# Patient Record
Sex: FEMALE | Race: WHITE | NOT HISPANIC OR LATINO | URBAN - METROPOLITAN AREA
[De-identification: names, ages, dates, MRNs, and addresses within clinical notes are randomized per-mention and may not be internally consistent; named-entity substitution may affect disease eponyms.]

---

## 2017-03-17 ENCOUNTER — EMERGENCY (EMERGENCY)
Facility: HOSPITAL | Age: 82
LOS: 0 days | Discharge: HOME | End: 2017-03-17
Attending: EMERGENCY MEDICINE | Admitting: EMERGENCY MEDICINE
Payer: COMMERCIAL

## 2017-03-17 VITALS
SYSTOLIC BLOOD PRESSURE: 105 MMHG | OXYGEN SATURATION: 98 % | RESPIRATION RATE: 18 BRPM | WEIGHT: 115.96 LBS | DIASTOLIC BLOOD PRESSURE: 51 MMHG | TEMPERATURE: 98 F | HEART RATE: 72 BPM

## 2017-03-17 VITALS
DIASTOLIC BLOOD PRESSURE: 64 MMHG | RESPIRATION RATE: 16 BRPM | SYSTOLIC BLOOD PRESSURE: 128 MMHG | TEMPERATURE: 98 F | OXYGEN SATURATION: 100 % | HEART RATE: 69 BPM

## 2017-03-17 DIAGNOSIS — Z91.81 HISTORY OF FALLING: ICD-10-CM
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
## 2017-03-17 DIAGNOSIS — S09.90XA UNSPECIFIED INJURY OF HEAD, INITIAL ENCOUNTER: ICD-10-CM
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
## 2017-03-17 DIAGNOSIS — F03.90 UNSPECIFIED DEMENTIA WITHOUT BEHAVIORAL DISTURBANCE: ICD-10-CM

## 2017-03-17 PROCEDURE — 99284 EMERGENCY DEPT VISIT MOD MDM: CPT

## 2017-03-17 PROCEDURE — 70450 CT HEAD/BRAIN W/O DYE: CPT | Mod: 26

## 2017-03-17 PROCEDURE — 72125 CT NECK SPINE W/O DYE: CPT | Mod: 26

## 2017-03-17 NOTE — ED PROVIDER NOTE - OBJECTIVE STATEMENT
89 y/o F with a PMHx of dementia presents to the ED from [facility] because she had a mechanical fall and protocol states that any fall needs to be brought to the ED for evaluation. Pt currently calm, confused, unsure as to why she is here and denies any acute c/o at this time.

## 2017-03-17 NOTE — ED PROVIDER NOTE - NS ED MD SCRIBE ATTENDING SCRIBE SECTIONS
DISPOSITION/PROGRESS NOTE/PHYSICAL EXAM/HISTORY OF PRESENT ILLNESS/REVIEW OF SYSTEMS/RESULTS/PAST MEDICAL/SURGICAL/SOCIAL HISTORY

## 2017-07-10 ENCOUNTER — EMERGENCY (EMERGENCY)
Facility: HOSPITAL | Age: 82
LOS: 0 days | Discharge: HOME | End: 2017-07-10
Attending: EMERGENCY MEDICINE | Admitting: EMERGENCY MEDICINE
Payer: COMMERCIAL

## 2017-07-10 VITALS
SYSTOLIC BLOOD PRESSURE: 184 MMHG | WEIGHT: 115.96 LBS | DIASTOLIC BLOOD PRESSURE: 97 MMHG | HEART RATE: 113 BPM | OXYGEN SATURATION: 98 % | TEMPERATURE: 97 F | RESPIRATION RATE: 16 BRPM

## 2017-07-10 VITALS
TEMPERATURE: 98 F | DIASTOLIC BLOOD PRESSURE: 88 MMHG | OXYGEN SATURATION: 99 % | SYSTOLIC BLOOD PRESSURE: 142 MMHG | RESPIRATION RATE: 18 BRPM | HEART RATE: 68 BPM
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
## 2017-07-10 DIAGNOSIS — S01.81XA LACERATION WITHOUT FOREIGN BODY OF OTHER PART OF HEAD, INITIAL ENCOUNTER: ICD-10-CM

## 2017-07-10 DIAGNOSIS — Z91.81 HISTORY OF FALLING: ICD-10-CM

## 2017-07-10 DIAGNOSIS — S09.90XA UNSPECIFIED INJURY OF HEAD, INITIAL ENCOUNTER: ICD-10-CM
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
## 2017-07-10 DIAGNOSIS — S02.2XXA FRACTURE OF NASAL BONES, INITIAL ENCOUNTER FOR CLOSED FRACTURE: ICD-10-CM

## 2017-07-10 DIAGNOSIS — F03.90 UNSPECIFIED DEMENTIA WITHOUT BEHAVIORAL DISTURBANCE: ICD-10-CM

## 2017-07-10 LAB
ABO RH CONFIRMATION: SIGNIFICANT CHANGE UP
ALBUMIN SERPL ELPH-MCNC: 3.5 G/DL — SIGNIFICANT CHANGE UP (ref 3.3–5)
ALP SERPL-CCNC: 74 U/L — SIGNIFICANT CHANGE UP (ref 40–120)
ALT FLD-CCNC: 15 U/L — SIGNIFICANT CHANGE UP (ref 12–78)
ANION GAP SERPL CALC-SCNC: 6 MMOL/L — SIGNIFICANT CHANGE UP (ref 5–17)
APPEARANCE UR: CLEAR — SIGNIFICANT CHANGE UP
APTT BLD: 31.2 SEC — SIGNIFICANT CHANGE UP (ref 27.5–37.4)
AST SERPL-CCNC: 18 U/L — SIGNIFICANT CHANGE UP (ref 15–37)
BASOPHILS # BLD AUTO: 0.1 K/UL — SIGNIFICANT CHANGE UP (ref 0–0.2)
BASOPHILS NFR BLD AUTO: 1.8 % — SIGNIFICANT CHANGE UP (ref 0–2)
BILIRUB SERPL-MCNC: 0.4 MG/DL — SIGNIFICANT CHANGE UP (ref 0.2–1.2)
BILIRUB UR-MCNC: NEGATIVE — SIGNIFICANT CHANGE UP
BLD GP AB SCN SERPL QL: SIGNIFICANT CHANGE UP
BUN SERPL-MCNC: 15 MG/DL — SIGNIFICANT CHANGE UP (ref 7–23)
CALCIUM SERPL-MCNC: 9.7 MG/DL — SIGNIFICANT CHANGE UP (ref 8.5–10.1)
CHLORIDE SERPL-SCNC: 104 MMOL/L — SIGNIFICANT CHANGE UP (ref 96–108)
CO2 SERPL-SCNC: 29 MMOL/L — SIGNIFICANT CHANGE UP (ref 22–31)
COLOR SPEC: YELLOW — SIGNIFICANT CHANGE UP
CREAT SERPL-MCNC: 0.7 MG/DL — SIGNIFICANT CHANGE UP (ref 0.5–1.3)
DIFF PNL FLD: NEGATIVE — SIGNIFICANT CHANGE UP
EOSINOPHIL # BLD AUTO: 0.2 K/UL — SIGNIFICANT CHANGE UP (ref 0–0.5)
EOSINOPHIL NFR BLD AUTO: 2.2 % — SIGNIFICANT CHANGE UP (ref 0–6)
GLUCOSE SERPL-MCNC: 102 MG/DL — HIGH (ref 70–99)
GLUCOSE UR QL: NEGATIVE MG/DL — SIGNIFICANT CHANGE UP
HCT VFR BLD CALC: 42.1 % — SIGNIFICANT CHANGE UP (ref 34.5–45)
HGB BLD-MCNC: 13.9 G/DL — SIGNIFICANT CHANGE UP (ref 11.5–15.5)
INR BLD: 1.03 RATIO — SIGNIFICANT CHANGE UP (ref 0.88–1.16)
KETONES UR-MCNC: NEGATIVE — SIGNIFICANT CHANGE UP
LEUKOCYTE ESTERASE UR-ACNC: NEGATIVE — SIGNIFICANT CHANGE UP
LYMPHOCYTES # BLD AUTO: 0.9 K/UL — LOW (ref 1–3.3)
LYMPHOCYTES # BLD AUTO: 11.5 % — LOW (ref 13–44)
MCHC RBC-ENTMCNC: 28.9 PG — SIGNIFICANT CHANGE UP (ref 27–34)
MCHC RBC-ENTMCNC: 32.9 GM/DL — SIGNIFICANT CHANGE UP (ref 32–36)
MCV RBC AUTO: 87.8 FL — SIGNIFICANT CHANGE UP (ref 80–100)
MONOCYTES # BLD AUTO: 1 K/UL — HIGH (ref 0–0.9)
MONOCYTES NFR BLD AUTO: 13 % — SIGNIFICANT CHANGE UP (ref 2–14)
NEUTROPHILS # BLD AUTO: 5.3 K/UL — SIGNIFICANT CHANGE UP (ref 1.8–7.4)
NEUTROPHILS NFR BLD AUTO: 71.4 % — SIGNIFICANT CHANGE UP (ref 43–77)
NITRITE UR-MCNC: NEGATIVE — SIGNIFICANT CHANGE UP
PH UR: 8 — SIGNIFICANT CHANGE UP (ref 5–8)
PLATELET # BLD AUTO: 223 K/UL — SIGNIFICANT CHANGE UP (ref 150–400)
POTASSIUM SERPL-MCNC: 4.2 MMOL/L — SIGNIFICANT CHANGE UP (ref 3.5–5.3)
POTASSIUM SERPL-SCNC: 4.2 MMOL/L — SIGNIFICANT CHANGE UP (ref 3.5–5.3)
PROT SERPL-MCNC: 8 GM/DL — SIGNIFICANT CHANGE UP (ref 6–8.3)
PROT UR-MCNC: NEGATIVE MG/DL — SIGNIFICANT CHANGE UP
PROTHROM AB SERPL-ACNC: 11.1 SEC — SIGNIFICANT CHANGE UP (ref 9.8–12.7)
RBC # BLD: 4.8 M/UL — SIGNIFICANT CHANGE UP (ref 3.8–5.2)
RBC # FLD: 12.7 % — SIGNIFICANT CHANGE UP (ref 10.3–14.5)
SODIUM SERPL-SCNC: 139 MMOL/L — SIGNIFICANT CHANGE UP (ref 135–145)
SP GR SPEC: 1.01 — SIGNIFICANT CHANGE UP (ref 1.01–1.02)
TYPE + AB SCN PNL BLD: SIGNIFICANT CHANGE UP
UROBILINOGEN FLD QL: NEGATIVE MG/DL — SIGNIFICANT CHANGE UP
WBC # BLD: 7.5 K/UL — SIGNIFICANT CHANGE UP (ref 3.8–10.5)
WBC # FLD AUTO: 7.5 K/UL — SIGNIFICANT CHANGE UP (ref 3.8–10.5)

## 2017-07-10 PROCEDURE — 93010 ELECTROCARDIOGRAM REPORT: CPT

## 2017-07-10 PROCEDURE — 71250 CT THORAX DX C-: CPT | Mod: 26

## 2017-07-10 PROCEDURE — 70486 CT MAXILLOFACIAL W/O DYE: CPT | Mod: 26

## 2017-07-10 PROCEDURE — 72125 CT NECK SPINE W/O DYE: CPT | Mod: 26

## 2017-07-10 PROCEDURE — 70450 CT HEAD/BRAIN W/O DYE: CPT | Mod: 26

## 2017-07-10 PROCEDURE — 99285 EMERGENCY DEPT VISIT HI MDM: CPT

## 2017-07-10 PROCEDURE — 74176 CT ABD & PELVIS W/O CONTRAST: CPT | Mod: 26

## 2017-07-10 PROCEDURE — 12011 RPR F/E/E/N/L/M 2.5 CM/<: CPT

## 2017-07-10 PROCEDURE — 76377 3D RENDER W/INTRP POSTPROCES: CPT | Mod: 26

## 2017-07-10 NOTE — ED PROVIDER NOTE - PROGRESS NOTE DETAILS
signed Suzanne Soares PA-C   Asked to suture pt. Pt is trauma alert, c-collar placed on initial evaluation maintaining C-spine immobilization at all times.

## 2017-07-10 NOTE — ED PROVIDER NOTE - OBJECTIVE STATEMENT
89 yo female with hx dementia sent from assisted living s/p fall. Patient needs walker, but was walking without and fell forward. hx obtained from EMS and NH. Patient doesn't answer questions so unable to provide history

## 2017-07-10 NOTE — ED ADULT NURSE NOTE - OBJECTIVE STATEMENT
Pt biba s/p witnessed fall at the NH.  Pt usually ambulates with a walker . Was excited about eating   breakfast and slipped and fell. No LOC, no anti-coags. laceration to the   right forehead

## 2017-07-10 NOTE — ED PROVIDER NOTE - CARE PLAN
Principal Discharge DX:	Head injury  Secondary Diagnosis:	Facial laceration  Secondary Diagnosis:	Nasal fracture

## 2017-08-30 ENCOUNTER — EMERGENCY (EMERGENCY)
Facility: HOSPITAL | Age: 82
LOS: 0 days | Discharge: HOME | End: 2017-08-30
Attending: EMERGENCY MEDICINE | Admitting: EMERGENCY MEDICINE
Payer: COMMERCIAL

## 2017-08-30 VITALS
SYSTOLIC BLOOD PRESSURE: 106 MMHG | HEART RATE: 73 BPM | DIASTOLIC BLOOD PRESSURE: 60 MMHG | RESPIRATION RATE: 16 BRPM | OXYGEN SATURATION: 95 % | TEMPERATURE: 98 F | WEIGHT: 145.06 LBS

## 2017-08-30 VITALS
DIASTOLIC BLOOD PRESSURE: 78 MMHG | OXYGEN SATURATION: 96 % | HEART RATE: 75 BPM | TEMPERATURE: 98 F | RESPIRATION RATE: 17 BRPM | SYSTOLIC BLOOD PRESSURE: 148 MMHG

## 2017-08-30 DIAGNOSIS — F03.90 UNSPECIFIED DEMENTIA WITHOUT BEHAVIORAL DISTURBANCE: ICD-10-CM

## 2017-08-30 DIAGNOSIS — R41.82 ALTERED MENTAL STATUS, UNSPECIFIED: ICD-10-CM

## 2017-08-30 DIAGNOSIS — I45.10 UNSPECIFIED RIGHT BUNDLE-BRANCH BLOCK: ICD-10-CM

## 2017-08-30 DIAGNOSIS — E86.0 DEHYDRATION: ICD-10-CM

## 2017-08-30 LAB
ALBUMIN SERPL ELPH-MCNC: 3.3 G/DL — SIGNIFICANT CHANGE UP (ref 3.3–5)
ALP SERPL-CCNC: 80 U/L — SIGNIFICANT CHANGE UP (ref 40–120)
ALT FLD-CCNC: 21 U/L — SIGNIFICANT CHANGE UP (ref 12–78)
ANION GAP SERPL CALC-SCNC: 6 MMOL/L — SIGNIFICANT CHANGE UP (ref 5–17)
APTT BLD: 31.1 SEC — SIGNIFICANT CHANGE UP (ref 27.5–37.4)
AST SERPL-CCNC: 18 U/L — SIGNIFICANT CHANGE UP (ref 15–37)
BASOPHILS # BLD AUTO: 0.2 K/UL — SIGNIFICANT CHANGE UP (ref 0–0.2)
BASOPHILS NFR BLD AUTO: 1 % — SIGNIFICANT CHANGE UP (ref 0–2)
BILIRUB SERPL-MCNC: 0.2 MG/DL — SIGNIFICANT CHANGE UP (ref 0.2–1.2)
BUN SERPL-MCNC: 26 MG/DL — HIGH (ref 7–23)
CALCIUM SERPL-MCNC: 9.5 MG/DL — SIGNIFICANT CHANGE UP (ref 8.5–10.1)
CHLORIDE SERPL-SCNC: 105 MMOL/L — SIGNIFICANT CHANGE UP (ref 96–108)
CO2 SERPL-SCNC: 30 MMOL/L — SIGNIFICANT CHANGE UP (ref 22–31)
CREAT SERPL-MCNC: 1 MG/DL — SIGNIFICANT CHANGE UP (ref 0.5–1.3)
EOSINOPHIL # BLD AUTO: 0.3 K/UL — SIGNIFICANT CHANGE UP (ref 0–0.5)
EOSINOPHIL NFR BLD AUTO: 1 % — SIGNIFICANT CHANGE UP (ref 0–6)
GLUCOSE SERPL-MCNC: 110 MG/DL — HIGH (ref 70–99)
HCT VFR BLD CALC: 41.6 % — SIGNIFICANT CHANGE UP (ref 34.5–45)
HGB BLD-MCNC: 14.2 G/DL — SIGNIFICANT CHANGE UP (ref 11.5–15.5)
INR BLD: 1.05 RATIO — SIGNIFICANT CHANGE UP (ref 0.88–1.16)
LYMPHOCYTES # BLD AUTO: 1.2 K/UL — SIGNIFICANT CHANGE UP (ref 1–3.3)
LYMPHOCYTES # BLD AUTO: 11 % — LOW (ref 13–44)
MANUAL DIF COMMENT BLD-IMP: SIGNIFICANT CHANGE UP
MCHC RBC-ENTMCNC: 30.2 PG — SIGNIFICANT CHANGE UP (ref 27–34)
MCHC RBC-ENTMCNC: 34 GM/DL — SIGNIFICANT CHANGE UP (ref 32–36)
MCV RBC AUTO: 88.8 FL — SIGNIFICANT CHANGE UP (ref 80–100)
MONOCYTES # BLD AUTO: 1.3 K/UL — HIGH (ref 0–0.9)
MONOCYTES NFR BLD AUTO: 15 % — HIGH (ref 2–14)
NEUTROPHILS # BLD AUTO: 5.2 K/UL — SIGNIFICANT CHANGE UP (ref 1.8–7.4)
NEUTROPHILS NFR BLD AUTO: 70 % — SIGNIFICANT CHANGE UP (ref 43–77)
NEUTS BAND # BLD: 2 % — SIGNIFICANT CHANGE UP (ref 0–8)
PLAT MORPH BLD: NORMAL — SIGNIFICANT CHANGE UP
PLATELET # BLD AUTO: 224 K/UL — SIGNIFICANT CHANGE UP (ref 150–400)
POTASSIUM SERPL-MCNC: 4.4 MMOL/L — SIGNIFICANT CHANGE UP (ref 3.5–5.3)
POTASSIUM SERPL-SCNC: 4.4 MMOL/L — SIGNIFICANT CHANGE UP (ref 3.5–5.3)
PROT SERPL-MCNC: 8.1 GM/DL — SIGNIFICANT CHANGE UP (ref 6–8.3)
PROTHROM AB SERPL-ACNC: 11.3 SEC — SIGNIFICANT CHANGE UP (ref 9.8–12.7)
RBC # BLD: 4.69 M/UL — SIGNIFICANT CHANGE UP (ref 3.8–5.2)
RBC # FLD: 13.9 % — SIGNIFICANT CHANGE UP (ref 10.3–14.5)
RBC BLD AUTO: SIGNIFICANT CHANGE UP
SODIUM SERPL-SCNC: 141 MMOL/L — SIGNIFICANT CHANGE UP (ref 135–145)
TROPONIN I SERPL-MCNC: <0.015 NG/ML — SIGNIFICANT CHANGE UP (ref 0.01–0.04)
WBC # BLD: 8.2 K/UL — SIGNIFICANT CHANGE UP (ref 3.8–10.5)
WBC # FLD AUTO: 8.2 K/UL — SIGNIFICANT CHANGE UP (ref 3.8–10.5)

## 2017-08-30 PROCEDURE — 71010: CPT | Mod: 26

## 2017-08-30 PROCEDURE — 93010 ELECTROCARDIOGRAM REPORT: CPT

## 2017-08-30 PROCEDURE — 99285 EMERGENCY DEPT VISIT HI MDM: CPT

## 2017-08-30 RX ORDER — SODIUM CHLORIDE 9 MG/ML
1000 INJECTION INTRAMUSCULAR; INTRAVENOUS; SUBCUTANEOUS ONCE
Qty: 0 | Refills: 0 | Status: COMPLETED | OUTPATIENT
Start: 2017-08-30 | End: 2017-08-30

## 2017-08-30 RX ORDER — SODIUM CHLORIDE 9 MG/ML
3 INJECTION INTRAMUSCULAR; INTRAVENOUS; SUBCUTANEOUS ONCE
Qty: 0 | Refills: 0 | Status: COMPLETED | OUTPATIENT
Start: 2017-08-30 | End: 2017-08-30

## 2017-08-30 RX ADMIN — SODIUM CHLORIDE 1000 MILLILITER(S): 9 INJECTION INTRAMUSCULAR; INTRAVENOUS; SUBCUTANEOUS at 18:00

## 2017-08-30 RX ADMIN — SODIUM CHLORIDE 3 MILLILITER(S): 9 INJECTION INTRAMUSCULAR; INTRAVENOUS; SUBCUTANEOUS at 16:52

## 2017-08-30 NOTE — ED ADULT TRIAGE NOTE - CHIEF COMPLAINT QUOTE
witnessed syncope, sitting with daughter, no fall, unconscious for 2 minutes, now at baseline level of dementia per ambulance/daughter. DNR/DNI

## 2017-08-30 NOTE — ED PROVIDER NOTE - OBJECTIVE STATEMENT
89 yo female h/o dementia presents with episode of AMS. As per daughter they were at [facility] sitting in the courtyard when pt began feeling dizzy and became out of it for 8-10 minutes. States pt did not lose consciousness but was moaning and almost fell over. Denies fall. Has not ambulated since. Pt is back to baseline per daughter.

## 2017-08-30 NOTE — ED PROVIDER NOTE - PROGRESS NOTE DETAILS
Pt remains at baseline per daughter. EKG nonischemic, no monitor events, trop negative. Daughter requesting DC back to [facility]

## 2017-09-26 ENCOUNTER — EMERGENCY (EMERGENCY)
Facility: HOSPITAL | Age: 82
LOS: 0 days | Discharge: HOME | End: 2017-09-26
Attending: EMERGENCY MEDICINE | Admitting: EMERGENCY MEDICINE
Payer: COMMERCIAL

## 2017-09-26 VITALS
OXYGEN SATURATION: 100 % | HEART RATE: 67 BPM | DIASTOLIC BLOOD PRESSURE: 77 MMHG | WEIGHT: 139.99 LBS | SYSTOLIC BLOOD PRESSURE: 139 MMHG | TEMPERATURE: 97 F | HEIGHT: 66 IN | RESPIRATION RATE: 16 BRPM

## 2017-09-26 VITALS
TEMPERATURE: 98 F | RESPIRATION RATE: 17 BRPM | OXYGEN SATURATION: 100 % | DIASTOLIC BLOOD PRESSURE: 81 MMHG | SYSTOLIC BLOOD PRESSURE: 189 MMHG | HEART RATE: 74 BPM

## 2017-09-26 DIAGNOSIS — R29.6 REPEATED FALLS: ICD-10-CM

## 2017-09-26 DIAGNOSIS — I67.89 OTHER CEREBROVASCULAR DISEASE: ICD-10-CM

## 2017-09-26 DIAGNOSIS — Z04.3 ENCOUNTER FOR EXAMINATION AND OBSERVATION FOLLOWING OTHER ACCIDENT: ICD-10-CM

## 2017-09-26 DIAGNOSIS — M50.30 OTHER CERVICAL DISC DEGENERATION, UNSPECIFIED CERVICAL REGION: ICD-10-CM

## 2017-09-26 DIAGNOSIS — M47.9 SPONDYLOSIS, UNSPECIFIED: ICD-10-CM

## 2017-09-26 PROCEDURE — 72125 CT NECK SPINE W/O DYE: CPT | Mod: 26

## 2017-09-26 PROCEDURE — 72190 X-RAY EXAM OF PELVIS: CPT | Mod: 26

## 2017-09-26 PROCEDURE — 70450 CT HEAD/BRAIN W/O DYE: CPT | Mod: 26

## 2017-09-26 PROCEDURE — 99284 EMERGENCY DEPT VISIT MOD MDM: CPT

## 2017-09-26 PROCEDURE — 71010: CPT | Mod: 26

## 2017-09-26 NOTE — ED PROVIDER NOTE - MEDICAL DECISION MAKING DETAILS
89 yo female h/o dementia presents s/p fall. Plan CT head. 89 yo female h/o dementia presents s/p fall. Plan hip and c-spine CT, chest and hip XR.

## 2017-09-26 NOTE — ED ADULT NURSE NOTE - OBJECTIVE STATEMENT
Pt BIBA for unwitnessed fall at [facility] - pt with h/o dementia and confused at baseline - pt denies any injuries or pain - no s/s of injury noted. Trauma alert cancelled.

## 2017-09-26 NOTE — ED PROVIDER NOTE - OBJECTIVE STATEMENT
89 yo female h/o dementia presents s/p unwitnessed fall with head injury. Not on blood thinners. Patient does not know what happened today or why she is here. Denies pain at this time. Has no complaints.

## 2017-12-19 ENCOUNTER — INPATIENT (INPATIENT)
Facility: HOSPITAL | Age: 82
LOS: 7 days | Discharge: NURSING, REHAB OR LONG-TERM CARE | End: 2017-12-27
Attending: INTERNAL MEDICINE | Admitting: INTERNAL MEDICINE
Payer: COMMERCIAL

## 2017-12-19 VITALS
OXYGEN SATURATION: 98 % | HEART RATE: 76 BPM | WEIGHT: 110.01 LBS | TEMPERATURE: 98 F | RESPIRATION RATE: 17 BRPM | DIASTOLIC BLOOD PRESSURE: 84 MMHG | SYSTOLIC BLOOD PRESSURE: 155 MMHG

## 2017-12-19 LAB
ALBUMIN SERPL ELPH-MCNC: 3.4 G/DL — SIGNIFICANT CHANGE UP (ref 3.3–5)
ALP SERPL-CCNC: 77 U/L — SIGNIFICANT CHANGE UP (ref 40–120)
ALT FLD-CCNC: 21 U/L — SIGNIFICANT CHANGE UP (ref 12–78)
ANION GAP SERPL CALC-SCNC: 7 MMOL/L — SIGNIFICANT CHANGE UP (ref 5–17)
APPEARANCE UR: CLEAR — SIGNIFICANT CHANGE UP
APTT BLD: 31.8 SEC — SIGNIFICANT CHANGE UP (ref 27.5–37.4)
AST SERPL-CCNC: 20 U/L — SIGNIFICANT CHANGE UP (ref 15–37)
BACTERIA # UR AUTO: (no result)
BASOPHILS # BLD AUTO: 0.1 K/UL — SIGNIFICANT CHANGE UP (ref 0–0.2)
BASOPHILS NFR BLD AUTO: 1.6 % — SIGNIFICANT CHANGE UP (ref 0–2)
BILIRUB SERPL-MCNC: 0.5 MG/DL — SIGNIFICANT CHANGE UP (ref 0.2–1.2)
BILIRUB UR-MCNC: NEGATIVE — SIGNIFICANT CHANGE UP
BUN SERPL-MCNC: 21 MG/DL — SIGNIFICANT CHANGE UP (ref 7–23)
CALCIUM SERPL-MCNC: 9.5 MG/DL — SIGNIFICANT CHANGE UP (ref 8.5–10.1)
CHLORIDE SERPL-SCNC: 107 MMOL/L — SIGNIFICANT CHANGE UP (ref 96–108)
CO2 SERPL-SCNC: 29 MMOL/L — SIGNIFICANT CHANGE UP (ref 22–31)
COLOR SPEC: YELLOW — SIGNIFICANT CHANGE UP
CREAT SERPL-MCNC: 0.83 MG/DL — SIGNIFICANT CHANGE UP (ref 0.5–1.3)
DIFF PNL FLD: (no result)
EOSINOPHIL # BLD AUTO: 0.2 K/UL — SIGNIFICANT CHANGE UP (ref 0–0.5)
EOSINOPHIL NFR BLD AUTO: 3 % — SIGNIFICANT CHANGE UP (ref 0–6)
EPI CELLS # UR: SIGNIFICANT CHANGE UP
GLUCOSE BLDC GLUCOMTR-MCNC: 98 MG/DL — SIGNIFICANT CHANGE UP (ref 70–99)
GLUCOSE SERPL-MCNC: 99 MG/DL — SIGNIFICANT CHANGE UP (ref 70–99)
GLUCOSE UR QL: NEGATIVE MG/DL — SIGNIFICANT CHANGE UP
HCT VFR BLD CALC: 47.2 % — HIGH (ref 34.5–45)
HGB BLD-MCNC: 15.1 G/DL — SIGNIFICANT CHANGE UP (ref 11.5–15.5)
INR BLD: 1.07 RATIO — SIGNIFICANT CHANGE UP (ref 0.88–1.16)
KETONES UR-MCNC: (no result)
LACTATE SERPL-SCNC: 1 MMOL/L — SIGNIFICANT CHANGE UP (ref 0.7–2)
LEUKOCYTE ESTERASE UR-ACNC: (no result)
LYMPHOCYTES # BLD AUTO: 1.3 K/UL — SIGNIFICANT CHANGE UP (ref 1–3.3)
LYMPHOCYTES # BLD AUTO: 16.4 % — SIGNIFICANT CHANGE UP (ref 13–44)
MCHC RBC-ENTMCNC: 28.8 PG — SIGNIFICANT CHANGE UP (ref 27–34)
MCHC RBC-ENTMCNC: 31.9 GM/DL — LOW (ref 32–36)
MCV RBC AUTO: 90.2 FL — SIGNIFICANT CHANGE UP (ref 80–100)
MONOCYTES # BLD AUTO: 0.9 K/UL — SIGNIFICANT CHANGE UP (ref 0–0.9)
MONOCYTES NFR BLD AUTO: 12.3 % — SIGNIFICANT CHANGE UP (ref 2–14)
NEUTROPHILS # BLD AUTO: 5.1 K/UL — SIGNIFICANT CHANGE UP (ref 1.8–7.4)
NEUTROPHILS NFR BLD AUTO: 66.7 % — SIGNIFICANT CHANGE UP (ref 43–77)
NITRITE UR-MCNC: NEGATIVE — SIGNIFICANT CHANGE UP
PH UR: 6.5 — SIGNIFICANT CHANGE UP (ref 5–8)
PLATELET # BLD AUTO: 256 K/UL — SIGNIFICANT CHANGE UP (ref 150–400)
POTASSIUM SERPL-MCNC: 4.2 MMOL/L — SIGNIFICANT CHANGE UP (ref 3.5–5.3)
POTASSIUM SERPL-SCNC: 4.2 MMOL/L — SIGNIFICANT CHANGE UP (ref 3.5–5.3)
PROT SERPL-MCNC: 8.1 GM/DL — SIGNIFICANT CHANGE UP (ref 6–8.3)
PROT UR-MCNC: NEGATIVE MG/DL — SIGNIFICANT CHANGE UP
PROTHROM AB SERPL-ACNC: 11.6 SEC — SIGNIFICANT CHANGE UP (ref 9.8–12.7)
RAPID RVP RESULT: SIGNIFICANT CHANGE UP
RBC # BLD: 5.24 M/UL — HIGH (ref 3.8–5.2)
RBC # FLD: 13.2 % — SIGNIFICANT CHANGE UP (ref 10.3–14.5)
RBC CASTS # UR COMP ASSIST: (no result) /HPF (ref 0–4)
SODIUM SERPL-SCNC: 143 MMOL/L — SIGNIFICANT CHANGE UP (ref 135–145)
SP GR SPEC: 1.01 — SIGNIFICANT CHANGE UP (ref 1.01–1.02)
TROPONIN I SERPL-MCNC: <0.015 NG/ML — SIGNIFICANT CHANGE UP (ref 0.01–0.04)
UROBILINOGEN FLD QL: NEGATIVE MG/DL — SIGNIFICANT CHANGE UP
WBC # BLD: 7.7 K/UL — SIGNIFICANT CHANGE UP (ref 3.8–10.5)
WBC # FLD AUTO: 7.7 K/UL — SIGNIFICANT CHANGE UP (ref 3.8–10.5)
WBC UR QL: (no result)

## 2017-12-19 PROCEDURE — 71010: CPT | Mod: 26

## 2017-12-19 PROCEDURE — 99285 EMERGENCY DEPT VISIT HI MDM: CPT

## 2017-12-19 PROCEDURE — 93010 ELECTROCARDIOGRAM REPORT: CPT

## 2017-12-19 PROCEDURE — 70450 CT HEAD/BRAIN W/O DYE: CPT | Mod: 26

## 2017-12-19 RX ORDER — CEFTRIAXONE 500 MG/1
1 INJECTION, POWDER, FOR SOLUTION INTRAMUSCULAR; INTRAVENOUS ONCE
Qty: 0 | Refills: 0 | Status: COMPLETED | OUTPATIENT
Start: 2017-12-19 | End: 2017-12-19

## 2017-12-19 RX ORDER — SODIUM CHLORIDE 9 MG/ML
500 INJECTION INTRAMUSCULAR; INTRAVENOUS; SUBCUTANEOUS ONCE
Qty: 0 | Refills: 0 | Status: COMPLETED | OUTPATIENT
Start: 2017-12-19 | End: 2017-12-19

## 2017-12-19 RX ORDER — AMLODIPINE BESYLATE 2.5 MG/1
1 TABLET ORAL
Qty: 0 | Refills: 0 | COMMUNITY

## 2017-12-19 RX ORDER — MIRABEGRON 50 MG/1
1 TABLET, EXTENDED RELEASE ORAL
Qty: 0 | Refills: 0 | COMMUNITY

## 2017-12-19 RX ADMIN — CEFTRIAXONE 100 GRAM(S): 500 INJECTION, POWDER, FOR SOLUTION INTRAMUSCULAR; INTRAVENOUS at 21:53

## 2017-12-19 RX ADMIN — SODIUM CHLORIDE 500 MILLILITER(S): 9 INJECTION INTRAMUSCULAR; INTRAVENOUS; SUBCUTANEOUS at 15:53

## 2017-12-19 NOTE — ED PROVIDER NOTE - CARE PLAN
Principal Discharge DX:	CVA (cerebral vascular accident)  Secondary Diagnosis:	UTI (urinary tract infection)

## 2017-12-19 NOTE — ED PROVIDER NOTE - OBJECTIVE STATEMENT
89 y/o F with PMHx of dementia, HTN presents to the ED from [facility] living regarding change in mental status. Per EMS, pt is at baseline, but pt has not been eating or drinking. No other history available. Pt is a poor historian due to dementia.

## 2017-12-19 NOTE — ED PROVIDER NOTE - CARDIAC, MLM
Normal rate, regular rhythm.  Heart sounds S1, S2.  No murmurs, rubs or gallops. +trace peripheral edema

## 2017-12-19 NOTE — ED ADULT NURSE NOTE - OBJECTIVE STATEMENT
patient biba for altered mental status as per ambulance crew.  Patient is awake and alert, oriented to self only.  vss rectal temp 97.6.  patient cooperative with exam

## 2017-12-20 DIAGNOSIS — Z29.9 ENCOUNTER FOR PROPHYLACTIC MEASURES, UNSPECIFIED: ICD-10-CM

## 2017-12-20 DIAGNOSIS — F03.90 UNSPECIFIED DEMENTIA WITHOUT BEHAVIORAL DISTURBANCE: ICD-10-CM

## 2017-12-20 DIAGNOSIS — N39.0 URINARY TRACT INFECTION, SITE NOT SPECIFIED: ICD-10-CM

## 2017-12-20 DIAGNOSIS — I10 ESSENTIAL (PRIMARY) HYPERTENSION: ICD-10-CM

## 2017-12-20 DIAGNOSIS — I63.9 CEREBRAL INFARCTION, UNSPECIFIED: ICD-10-CM

## 2017-12-20 LAB
ANION GAP SERPL CALC-SCNC: 7 MMOL/L — SIGNIFICANT CHANGE UP (ref 5–17)
BUN SERPL-MCNC: 17 MG/DL — SIGNIFICANT CHANGE UP (ref 7–23)
CALCIUM SERPL-MCNC: 8.9 MG/DL — SIGNIFICANT CHANGE UP (ref 8.5–10.1)
CHLORIDE SERPL-SCNC: 107 MMOL/L — SIGNIFICANT CHANGE UP (ref 96–108)
CHOLEST SERPL-MCNC: 247 MG/DL — HIGH (ref 10–199)
CO2 SERPL-SCNC: 27 MMOL/L — SIGNIFICANT CHANGE UP (ref 22–31)
CREAT SERPL-MCNC: 0.71 MG/DL — SIGNIFICANT CHANGE UP (ref 0.5–1.3)
GLUCOSE SERPL-MCNC: 93 MG/DL — SIGNIFICANT CHANGE UP (ref 70–99)
HBA1C BLD-MCNC: 5.8 % — HIGH (ref 4–5.6)
HDLC SERPL-MCNC: 42 MG/DL — SIGNIFICANT CHANGE UP (ref 40–125)
LIPID PNL WITH DIRECT LDL SERPL: 181 MG/DL — HIGH
POTASSIUM SERPL-MCNC: 3.9 MMOL/L — SIGNIFICANT CHANGE UP (ref 3.5–5.3)
POTASSIUM SERPL-SCNC: 3.9 MMOL/L — SIGNIFICANT CHANGE UP (ref 3.5–5.3)
SODIUM SERPL-SCNC: 141 MMOL/L — SIGNIFICANT CHANGE UP (ref 135–145)
TOTAL CHOLESTEROL/HDL RATIO MEASUREMENT: 5.9 RATIO — SIGNIFICANT CHANGE UP (ref 3.3–7.1)
TRIGL SERPL-MCNC: 121 MG/DL — SIGNIFICANT CHANGE UP (ref 10–149)

## 2017-12-20 PROCEDURE — 99285 EMERGENCY DEPT VISIT HI MDM: CPT

## 2017-12-20 RX ORDER — QUETIAPINE FUMARATE 200 MG/1
25 TABLET, FILM COATED ORAL AT BEDTIME
Qty: 0 | Refills: 0 | Status: DISCONTINUED | OUTPATIENT
Start: 2017-12-20 | End: 2017-12-21

## 2017-12-20 RX ORDER — HALOPERIDOL DECANOATE 100 MG/ML
2.5 INJECTION INTRAMUSCULAR ONCE
Qty: 0 | Refills: 0 | Status: COMPLETED | OUTPATIENT
Start: 2017-12-20 | End: 2017-12-20

## 2017-12-20 RX ORDER — SODIUM CHLORIDE 9 MG/ML
1000 INJECTION INTRAMUSCULAR; INTRAVENOUS; SUBCUTANEOUS
Qty: 0 | Refills: 0 | Status: COMPLETED | OUTPATIENT
Start: 2017-12-20 | End: 2017-12-21

## 2017-12-20 RX ORDER — ASPIRIN/CALCIUM CARB/MAGNESIUM 324 MG
325 TABLET ORAL DAILY
Qty: 0 | Refills: 0 | Status: DISCONTINUED | OUTPATIENT
Start: 2017-12-20 | End: 2017-12-27

## 2017-12-20 RX ORDER — ASPIRIN/CALCIUM CARB/MAGNESIUM 324 MG
325 TABLET ORAL ONCE
Qty: 0 | Refills: 0 | Status: COMPLETED | OUTPATIENT
Start: 2017-12-20 | End: 2017-12-20

## 2017-12-20 RX ORDER — ATORVASTATIN CALCIUM 80 MG/1
40 TABLET, FILM COATED ORAL AT BEDTIME
Qty: 0 | Refills: 0 | Status: DISCONTINUED | OUTPATIENT
Start: 2017-12-20 | End: 2017-12-27

## 2017-12-20 RX ORDER — CEFTRIAXONE 500 MG/1
1 INJECTION, POWDER, FOR SOLUTION INTRAMUSCULAR; INTRAVENOUS EVERY 24 HOURS
Qty: 0 | Refills: 0 | Status: DISCONTINUED | OUTPATIENT
Start: 2017-12-20 | End: 2017-12-20

## 2017-12-20 RX ORDER — HEPARIN SODIUM 5000 [USP'U]/ML
5000 INJECTION INTRAVENOUS; SUBCUTANEOUS EVERY 12 HOURS
Qty: 0 | Refills: 0 | Status: DISCONTINUED | OUTPATIENT
Start: 2017-12-20 | End: 2017-12-27

## 2017-12-20 RX ORDER — METOPROLOL TARTRATE 50 MG
25 TABLET ORAL DAILY
Qty: 0 | Refills: 0 | Status: DISCONTINUED | OUTPATIENT
Start: 2017-12-20 | End: 2017-12-20

## 2017-12-20 RX ORDER — AMLODIPINE BESYLATE 2.5 MG/1
2.5 TABLET ORAL DAILY
Qty: 0 | Refills: 0 | Status: DISCONTINUED | OUTPATIENT
Start: 2017-12-20 | End: 2017-12-20

## 2017-12-20 RX ORDER — CEFTRIAXONE 500 MG/1
1000 INJECTION, POWDER, FOR SOLUTION INTRAMUSCULAR; INTRAVENOUS EVERY 24 HOURS
Qty: 0 | Refills: 0 | Status: DISCONTINUED | OUTPATIENT
Start: 2017-12-20 | End: 2017-12-22

## 2017-12-20 RX ADMIN — HALOPERIDOL DECANOATE 2.5 MILLIGRAM(S): 100 INJECTION INTRAMUSCULAR at 17:28

## 2017-12-20 RX ADMIN — HEPARIN SODIUM 5000 UNIT(S): 5000 INJECTION INTRAVENOUS; SUBCUTANEOUS at 17:29

## 2017-12-20 RX ADMIN — Medication 325 MILLIGRAM(S): at 00:22

## 2017-12-20 RX ADMIN — SODIUM CHLORIDE 75 MILLILITER(S): 9 INJECTION INTRAMUSCULAR; INTRAVENOUS; SUBCUTANEOUS at 05:38

## 2017-12-20 RX ADMIN — QUETIAPINE FUMARATE 25 MILLIGRAM(S): 200 TABLET, FILM COATED ORAL at 22:12

## 2017-12-20 RX ADMIN — ATORVASTATIN CALCIUM 40 MILLIGRAM(S): 80 TABLET, FILM COATED ORAL at 22:12

## 2017-12-20 RX ADMIN — HEPARIN SODIUM 5000 UNIT(S): 5000 INJECTION INTRAVENOUS; SUBCUTANEOUS at 05:30

## 2017-12-20 RX ADMIN — CEFTRIAXONE 1000 MILLIGRAM(S): 500 INJECTION, POWDER, FOR SOLUTION INTRAMUSCULAR; INTRAVENOUS at 22:12

## 2017-12-20 NOTE — H&P ADULT - NSHPPHYSICALEXAM_GEN_ALL_CORE
Vital Signs Last 24 Hrs  T(C): 36.1 (19 Dec 2017 21:47), Max: 36.8 (19 Dec 2017 14:35)  T(F): 97 (19 Dec 2017 21:47), Max: 98.3 (19 Dec 2017 14:35)  HR: 73 (19 Dec 2017 21:47) (70 - 76)  BP: 123/84 (19 Dec 2017 21:47) (123/84 - 155/84)  BP(mean): --  RR: 16 (19 Dec 2017 21:47) (16 - 17)  SpO2: 97% (19 Dec 2017 21:47) (97% - 98%)

## 2017-12-20 NOTE — PROGRESS NOTE ADULT - ASSESSMENT
89 y/o F with PMHx of dementia and HTN presents to the ED from [facility] assisted living (dementia unit) found to have subacute CVA in the posterior left temporal occipital lobes as seen on CT head and UTI.      *Acute metabolic encephalopathy on top of baseline severe dementia- suspect 2/2 UTI and Subacute CVA. Unclear what her baseline mental status is, awaiting callback from daughter.  -ASA  -Statin  -Hold BP meds temporarily  -Speech and swallow  - mechanical soft  -Echo  -Tele monitoring  -MRI / MRA  -Neuro consult  -Neuro checks Q4  -PT  - Haldol x 1.  Start seroquel at night.    *UTI  - C/w ceftriaxone  - f/u cultures    *HTN  - BP meds held temporarily 2/2 CVA    *DVT ppx  - Heparin SubQ

## 2017-12-20 NOTE — H&P ADULT - HISTORY OF PRESENT ILLNESS
89 y/o F with PMHx of dementia and HTN presents to the ED from [facility] assisted living (dementia unit) regarding change in mental status. Per ED chart review, as per EMS pt is at baseline, but pt has not been eating or drinking. Pt is a poor historian due to dementia.  Patient is unable to answer questions or follow directions, which is patient's baseline as per my conversation with the nurse at [facility].    While in the ED patient was noted to have CT head positive for subacute stroke and UTI.  Patient was given ASA 325mg x1 and ceftriaxone. 89 y/o F with PMHx of dementia and HTN presents to the ED from [facility] (dementia unit) with change in mental status. Per ED chart review, as per EMS, pt is at baseline, but pt has not been eating or drinking. Pt is a poor historian due to dementia.  Patient mumbles answers to questions and nods her head in response to questions however does not follow directions, which is patient's baseline as per my conversation with the nurse aid Gemini at [facility].    While in the ED patient was noted to have CT head positive for subacute stroke and UTI.  Patient was given ASA 325mg x1 and ceftriaxone. 89 y/o F with PMHx of dementia and HTN presents to the ED from [facility] (dementia unit) with change in mental status. Per ED chart review, as per EMS, pt is at baseline, but pt has not been eating or drinking. Pt is a poor historian due to dementia.  Patient mumbles answers to questions and nods her head in response to questions however does not follow directions, which is patient's baseline as per lengthy conversation with the nurse aid Gemini at [facility].    While in the ED patient was noted to have CT head positive for subacute stroke and UTI.  Patient was given ASA 325mg x1 and ceftriaxone.

## 2017-12-20 NOTE — ED ADULT NURSE REASSESSMENT NOTE - NS ED NURSE REASSESS COMMENT FT1
Pt is confused and is unable to follow commands, unable to complete neurological checks.
Pt rec'd from [facility]. Pt is confused and combative. Constant observation placed on pt for safety and tube pulling prevention. Cardiac monitoring in place, but refusing VS at this time. NSR as per cardiac tech. Unable to perform neuro checks due to confusion. Safety and comfort maintained.
patient straight for 200 cc of cloudy yellow urine. patient tolerated procedure without event.  family updated on plan of care
Received patient from [facility]. Pt disoriented, hx of dementia. Pt calm at present, unable to perform neuro check due to patient unable to follow instructions. No weakness observed, pt cleaned and turned, pt able to reach for each side rail without difficulty, observed patient moving both legs independently. No facial asymmetry observed and no slurred speech observed. Pt refusing assessment, will attempt at a later time. Comfort and safety measures maintained. Will continue to monitor.

## 2017-12-20 NOTE — H&P ADULT - PROBLEM SELECTOR PLAN 3
-continue amlodipine and metoprolol daily -hold amlodipine and metoprolol for permissive hypertension post CVA

## 2017-12-20 NOTE — H&P ADULT - PROBLEM SELECTOR PLAN 1
-Admit to telemetry  -subacute infarction in the posterior left temporal occipital lobes, no acute hemorrhage  -s/p ASA 325mg in the ED  -continue ASA 81mg  -atorvastatin 40mg  -Lipid panel  -neuro consult - Dr. RONALD Rolon  -MRI head  -neuro checks  -fall precautions -Admit to telemetry  -CT head - subacute infarction in the posterior left temporal occipital lobes, no acute hemorrhage  -s/p ASA 325mg in the ED  -continue ASA 81mg  -atorvastatin 40mg  -Lipid panel  -neuro consult - Dr. RONALD Rolon  -MRI head   -neuro checks  -fall precautions  -NPO -Admit to telemetry  -CT head - subacute infarction in the posterior left temporal occipital lobes, no acute hemorrhage  -s/p ASA 325mg in the ED  -continue  mg daily  -atorvastatin 40mg  -Lipid panel  -neuro consult - Dr. RONALD Rolon  -MRI/MRA head  -MRA neck   -neuro checks q4h  -fall precautions  -NPO  -Physical therapy consult  -speech and swallow eval.    -ECHO

## 2017-12-20 NOTE — H&P ADULT - NSHPSOCIALHISTORY_GEN_ALL_CORE
Lives in [facility] assisted living Dementia Unit  unable to asses h/o ETOH, smoking or illicit drug use secondary to dementia.

## 2017-12-20 NOTE — H&P ADULT - ATTENDING COMMENTS
Patient seen and examined after initial evaluation above. Case discussed and reviewed in detail. Please note my plan below.     89 yo F with PMH of dementia, HTN, p/w AMS. Found to have a subacute stroke on CT    *Subacute CVA  -ASA  -Statin  -Hold BP meds temporarily  -Speech and swallow  -NPO  -Echo  -Tele monitoring  -MRI / MRA  -Neuro consult  -Neuro checks Q4  -PT    *UTI  -C/w ceftriaxone    *HTN  -BP meds held temporarily 2/2 CVA    *DVT ppx  -Heparin SubQ

## 2017-12-20 NOTE — H&P ADULT - MENTAL STATUS
demented as baseline.  unable to follow directions.  moving arms and legs normally Awake and alert, answers to her name, answers questions with mumbling and nodding her head, however, unable to follow directions.  moving arms and legs normally

## 2017-12-20 NOTE — PROGRESS NOTE ADULT - SUBJECTIVE AND OBJECTIVE BOX
CHIEF COMPLAINT: AMS    SUBJECTIVE: Unclear baseline mental status, attempted to reach patients daughter left VM with callback number.  Pt restless/aggitated trying to get out of bed, uncooperative to medical care.  placed on 1:1.     REVIEW OF SYSTEMS: Uto d/t mental status.    Vital Signs Last 24 Hrs  T(C): 36.7 (20 Dec 2017 12:28), Max: 36.7 (20 Dec 2017 12:28)  T(F): 98.1 (20 Dec 2017 12:28), Max: 98.1 (20 Dec 2017 12:28)  HR: 83 (20 Dec 2017 12:28) (71 - 83)  BP: 159/87 (20 Dec 2017 12:28) (123/84 - 159/87)  BP(mean): --  RR: 18 (20 Dec 2017 12:28) (16 - 18)  SpO2: 96% (20 Dec 2017 12:28) (96% - 98%)    PHYSICAL EXAM:  Constitutional: frail elderly female, aggitated trying to get out of bed.  HEENT: EOMI, NCAT  Neck: Soft and supple  Respiratory: Lungs grossly clear to auscultation  Cardiovascular: S1S2, RRR  Gastrointestinal: soft, nondistended, no guarding or rigidity  Extremities: No peripheral edema  Vascular: 2+ peripheral pulses  Neurological: unable to assess d/t pts mental status  Musculoskeletal: moving all extremities  Skin: No rashes    MEDICATIONS:  MEDICATIONS  (STANDING):  aspirin enteric coated 325 milliGRAM(s) Oral daily  atorvastatin 40 milliGRAM(s) Oral at bedtime  cefTRIAXone Injectable 1000 milliGRAM(s) IV Push every 24 hours  haloperidol    Injectable 2.5 milliGRAM(s) IntraMuscular once  heparin  Injectable 5000 Unit(s) SubCutaneous every 12 hours  QUEtiapine 25 milliGRAM(s) Oral at bedtime  sodium chloride 0.9%. 1000 milliLiter(s) (75 mL/Hr) IV Continuous <Continuous>    LABS: All Labs Reviewed:                        15.1   7.7   )-----------( 256      ( 19 Dec 2017 16:21 )             47.2     141  |  107  |  17  ----------------------------<  93  3.9   |  27  |  0.71    Ca    8.9      20 Dec 2017 06:26    TPro  8.1  /  Alb  3.4  /  TBili  0.5  /  DBili  x   /  AST  20  /  ALT  21  /  AlkPhos  77  12-19    PT/INR - ( 19 Dec 2017 16:21 )   PT: 11.6 sec;   INR: 1.07 ratio    PTT - ( 19 Dec 2017 16:21 )  PTT:31.8 sec  CARDIAC MARKERS ( 19 Dec 2017 16:21 )  <0.015 ng/mL / x     / x     / x     / x

## 2017-12-20 NOTE — H&P ADULT - ASSESSMENT
89 y/o F with PMHx of dementia and HTN presents to the ED from [facility] assisted living (dementia unit) found to have change in mental status. Per ED chart review, as per EMS pt is at baseline, but pt has not been eating or drinking. Pt is a poor historian due to dementia.  Patient is unable to answer questions or follow directions, which is patient's baseline as per my conversation with the nurse at [facility].    While in the ED patient was noted to have CT head positive for subacute stroke and UTI.  Patient was given ASA 325mg x1 and ceftriaxone. 89 y/o F with PMHx of dementia and HTN presents to the ED from [facility] assisted living (dementia unit) found to have subacute CVA in the posterior left temporal occipital lobes as seen on CT head and UTI.    Patient was given ASA 325mg x1 and ceftriaxone while in the ED.

## 2017-12-21 PROCEDURE — 99233 SBSQ HOSP IP/OBS HIGH 50: CPT

## 2017-12-21 PROCEDURE — 93306 TTE W/DOPPLER COMPLETE: CPT | Mod: 26

## 2017-12-21 RX ORDER — QUETIAPINE FUMARATE 200 MG/1
25 TABLET, FILM COATED ORAL AT BEDTIME
Qty: 0 | Refills: 0 | Status: DISCONTINUED | OUTPATIENT
Start: 2017-12-21 | End: 2017-12-22

## 2017-12-21 RX ORDER — AMLODIPINE BESYLATE 2.5 MG/1
2.5 TABLET ORAL DAILY
Qty: 0 | Refills: 0 | Status: DISCONTINUED | OUTPATIENT
Start: 2017-12-22 | End: 2017-12-27

## 2017-12-21 RX ADMIN — ATORVASTATIN CALCIUM 40 MILLIGRAM(S): 80 TABLET, FILM COATED ORAL at 22:37

## 2017-12-21 RX ADMIN — HEPARIN SODIUM 5000 UNIT(S): 5000 INJECTION INTRAVENOUS; SUBCUTANEOUS at 05:45

## 2017-12-21 RX ADMIN — Medication 325 MILLIGRAM(S): at 12:37

## 2017-12-21 RX ADMIN — CEFTRIAXONE 1000 MILLIGRAM(S): 500 INJECTION, POWDER, FOR SOLUTION INTRAMUSCULAR; INTRAVENOUS at 22:37

## 2017-12-21 RX ADMIN — SODIUM CHLORIDE 75 MILLILITER(S): 9 INJECTION INTRAMUSCULAR; INTRAVENOUS; SUBCUTANEOUS at 09:16

## 2017-12-21 RX ADMIN — HEPARIN SODIUM 5000 UNIT(S): 5000 INJECTION INTRAVENOUS; SUBCUTANEOUS at 16:51

## 2017-12-21 NOTE — SWALLOW BEDSIDE ASSESSMENT ADULT - SLP GENERAL OBSERVATIONS
Pt was arousable but fatigued/hypoactive. Eye opening was brief/fleeting. She was communicatively passive and internally distractible. Joint attention was fleeting. She did not follow commands and could not be directed to communication tasks. Of note is that pt occasionally spontaneously verbalized but output was brief/unintelligible/produced with unclear intent. Cognitive-Linguistic dysfunction is evident which hinders communicative competence/precludes the feasibility of assessing motor speech integrity. Above in setting of new left CVA, UTI and underlying dementia. Her needs must be anticipated.

## 2017-12-21 NOTE — SWALLOW BEDSIDE ASSESSMENT ADULT - ASR SWALLOW LABIAL MOBILITY
Limited probes notable for a generalized reduction in effort/agility when executing reflexive labial actions.

## 2017-12-21 NOTE — PROGRESS NOTE ADULT - ASSESSMENT
Assessment and Plan:   Assessment:  · Assessment		  89 y/o F with PMHx of dementia and HTN presents to the ED from [facility] assisted living (dementia unit) found to have subacute CVA in the posterior left temporal occipital lobes as seen on CT head and UTI.     CVA (cerebral vascular accident).       -Admit to telemetry  -CT head - subacute infarction in the posterior left temporal occipital lobes, no acute hemorrhage  -continue  mg daily  -atorvastatin 40mg  - as per chart family requests comfort care.  - no mRI/ or heroic measures.Correct underlying metabolic causes.  - correct metabolic causes  -will sign off the case.  - Reconsult as needed.

## 2017-12-21 NOTE — SWALLOW BEDSIDE ASSESSMENT ADULT - ORAL PHASE
Bolus formation/transfer were achieved via mildly to moderately prolonged/disorganized/discontinuous lingual pumping actions that were grossly functional with some modified PO textures. Piecemeal deglutition was evident. Mild tongue debris noted with pureed foods.

## 2017-12-21 NOTE — SWALLOW BEDSIDE ASSESSMENT ADULT - SWALLOW EVAL: RECOMMENDED DIET
SUGGEST A DYSPHAGIA 1 DIET WITH HONEY THICK LIQUIDS AS TOLERATED. THIS IS THE LEAST RESTRICTIVE TOLERABLE DIET CONSISTENCIES FROM AN OROPHARYNGEAL SWALLOWING STANCE AT THIS TIME WHEN PT IS ALERT.

## 2017-12-21 NOTE — CHART NOTE - NSCHARTNOTEFT_GEN_A_CORE
This SW left message for pts daughter/HCP Lisa Owen ([phone]-only # on file) to schedule a family meeting. Awaiting call back. Our team will continue to follow. This SW left message for pts daughter/HCP Lisa Owen ([phone]-only # on file) to schedule a family meeting. Awaiting call back.     Pt. does have copy of Comfort Care MOLST on chart (DNR/DNI, Comfort Measures, Do not send to hospital, No feeding tube-IV fluids and antibiotics were left blank). Our team will continue to follow.

## 2017-12-21 NOTE — SWALLOW BEDSIDE ASSESSMENT ADULT - COMMENTS
Pt was admitted from [facility] to  with altered mentation and reduced PO intake. Hospital course is notable for altered alertness/mentation, UTI and finding of left temporal/occipital infarcts on head CT. This profile is superimposed upon a prior history of HTN as well as dementia.

## 2017-12-21 NOTE — PHYSICAL THERAPY INITIAL EVALUATION ADULT - PERTINENT HX OF CURRENT PROBLEM, REHAB EVAL
Pt. presents from [facility] w/ change in mental status. CTH: suspect subacute infarct posterior L temporal occipital lobes, no evidence of acute hemorrhage.

## 2017-12-21 NOTE — SWALLOW BEDSIDE ASSESSMENT ADULT - NS SPL SWALLOW CLINIC TRIAL FT
Solids and thin liquids not offered given dysphagic profile and pt was not stimulable for use of compensatory swallow maneuvers given her altered mentation.

## 2017-12-21 NOTE — PHYSICAL THERAPY INITIAL EVALUATION ADULT - ADDITIONAL COMMENTS
SW called assisted living facility and stated pt. at baseline ambulates w/ a RW w/ one person assist and performs transfers independently on dementia calderon.

## 2017-12-21 NOTE — SWALLOW BEDSIDE ASSESSMENT ADULT - SWALLOW EVAL: RECOMMENDED FEEDING/EATING TECHNIQUES
crush medication (when feasible)/maintain upright posture during/after eating for 30 mins/check mouth frequently for oral residue/pocketing

## 2017-12-21 NOTE — SWALLOW BEDSIDE ASSESSMENT ADULT - SWALLOW EVAL: SECRETION MANAGEMENT
Limited probes revealed that her non nutritive reflexive cough was somewhat moist/mildly to moderately weakened in strength.

## 2017-12-21 NOTE — SWALLOW BEDSIDE ASSESSMENT ADULT - ASR SWALLOW LINGUAL MOBILITY
Limited probes notable for a generalized reduction in effort/agility when executing reflexive lingual actions.

## 2017-12-21 NOTE — CONSULT NOTE ADULT - ASSESSMENT
89y old Female coming from [facility] (dementia unit) with hx of advanced dementia (FAST7, few words, incontinent), 4 ER visits this year, and HTN, admitted 12/19 for altered mental status and decreased po intake. Found to have +UTI and CTH showing subacute stroke in posterior Left temporal occipital lobes. Family not desiring further workup only tx of infection. Palliative Care consulted to help establish GOC.     1) AMS/ new stroke  - baseline dementia, as per EMR few words  - also c/b infection and new stroke  - neurology notes appreciated  - family not wanting further imaging  - s/p haldol x1 and seroquel 25 with good result, calm today  - added seroquel 25 mg qhs prn, with 1:1 and hope to not need this    2) UTI  - on abx for this  - no fevers or inc wbc    3) Debility  - PPS<40%  - did some work with PT today    4) Prognosis  - poor  - in light of advanced age, advanced dementia, new stroke, and infection, with multiple hospital visits, and desire to focus on comfort, PPS<40%, would fit criteria for hospice    5) GOC/Advanced Directives  - pt does not have capacity for decision making  - HCP on chart noting daughter Lisa Kellogg ([phone]) and Floyosvany Owen  - DNR and DNI, confirmed today  -MOLST also found on portal noting DNR, DNI, comfort measures, do not send, no feeding tube decisions from 2015, but no such paperwork from [facility] (though checked off as completed on their paperwork) to evaluate last confirmation of these wishes. Daughter will bring official MOLST for our review tomorrow as well  - GOC meeting planned for tomorrow at 11am    Thank you for including us in Ms. Eden's care. Will continue to follow with you.    Hemant Gallegos MD  Palliative Care Attending
Assessment and Plan:   Assessment:  · Assessment		  89 y/o F with PMHx of dementia and HTN presents to the ED from [facility] assisted living (dementia unit) found to have subacute CVA in the posterior left temporal occipital lobes as seen on CT head and UTI.     CVA (cerebral vascular accident).       -Admit to telemetry  -CT head - subacute infarction in the posterior left temporal occipital lobes, no acute hemorrhage  -continue  mg daily  -atorvastatin 40mg  -MRI/ MRA brain , carotids.  -will f/u.  Correct underlying metabolic causes.

## 2017-12-21 NOTE — PROGRESS NOTE ADULT - SUBJECTIVE AND OBJECTIVE BOX
CHIEF COMPLAINT: AMS    SUBJECTIVE: discussed with patients daughter late last night, wants conservative measures, does not want pt to have MRI if she would require sedation.  Agreeable with plan to treat for UTI and send back to facility. Pts daughter has scheduled spine surgery today and will not be coming in as a result.  Pt more calm today.    REVIEW OF SYSTEMS: All other review of systems is negative unless indicated above    Vital Signs Last 24 Hrs  T(C): 36.7 (21 Dec 2017 10:51), Max: 37 (20 Dec 2017 20:01)  T(F): 98.1 (21 Dec 2017 10:51), Max: 98.6 (20 Dec 2017 20:01)  HR: 85 (21 Dec 2017 10:51) (78 - 85)  BP: 151/69 (21 Dec 2017 10:51) (142/71 - 170/79)  BP(mean): 98 (21 Dec 2017 05:20) (98 - 98)  RR: 18 (21 Dec 2017 10:51) (17 - 18)  SpO2: 95% (21 Dec 2017 10:51) (95% - 100%)    PHYSICAL EXAM:  Constitutional: frail elderly female, calm, in NAD  HEENT: EOMI, NCAT  Neck: Soft and supple  Respiratory: Lungs grossly clear to auscultation  Cardiovascular: S1S2, RRR  Gastrointestinal: soft, nondistended, no guarding or rigidity  Extremities: No peripheral edema  Vascular: 2+ peripheral pulses  Neurological: unable to assess d/t pts mental status  Musculoskeletal: moving all extremities  Skin: No rashes    MEDICATIONS:  MEDICATIONS  (STANDING):  aspirin enteric coated 325 milliGRAM(s) Oral daily  atorvastatin 40 milliGRAM(s) Oral at bedtime  cefTRIAXone Injectable 1000 milliGRAM(s) IV Push every 24 hours  heparin  Injectable 5000 Unit(s) SubCutaneous every 12 hours  QUEtiapine 25 milliGRAM(s) Oral at bedtime    LABS: All Labs Reviewed:                        15.1   7.7   )-----------( 256      ( 19 Dec 2017 16:21 )             47.2     141  |  107  |  17  ----------------------------<  93  3.9   |  27  |  0.71    Ca    8.9      20 Dec 2017 06:26    TPro  8.1  /  Alb  3.4  /  TBili  0.5  /  DBili  x   /  AST  20  /  ALT  21  /  AlkPhos  77  12-19    PT/INR - ( 19 Dec 2017 16:21 )   PT: 11.6 sec;   INR: 1.07 ratio    PTT - ( 19 Dec 2017 16:21 )  PTT:31.8 sec  CARDIAC MARKERS ( 19 Dec 2017 16:21 )  <0.015 ng/mL / x     / x     / x     / x        Blood Culture: 12-19 @ 16:21  Organism --  Gram Stain Blood -- Gram Stain --  Specimen Source .Blood None  Culture-Blood --

## 2017-12-21 NOTE — SWALLOW BEDSIDE ASSESSMENT ADULT - SWALLOW EVAL: DIAGNOSIS
1) Pt demonstrates reduced orientation to feeding atop Oropharyngeal Dysphagia which subjectively appeared to be a grossly functional condition with a restricted inventory of modified food textures when she was alert/cognizant enough to be fed. Above in setting of new left CVA, UTI and underlying dementia.   2) Pt was hypoactive and internally distractible. Joint attention was fleeting. She did not follow commands and could not be directed to communication tasks. Of note is that pt occasionally spontaneously verbalized but output was brief/unintelligible/produced with unclear intent. Cognitive-Linguistic dysfunction is evident which hinders communicative competence/precludes the feasibility of assessing motor speech integrity. Above in setting of new left CVA, UTI and underlying dementia.

## 2017-12-21 NOTE — SWALLOW BEDSIDE ASSESSMENT ADULT - ASR SWALLOW DENTITION
Notable for the presence of natural dentition with some missing maxillary teeth/yahaira remaining maxillary teeth in compromised condition. Notable for the presence of natural dentition with some missing maxillary teeth/some remaining maxillary teeth in compromised condition.

## 2017-12-21 NOTE — SWALLOW BEDSIDE ASSESSMENT ADULT - PHARYNGEAL PHASE
Swallow trigger was timely to slightly latent and laryngeal lift on palpation during swallow trials was mildly to moderately reduced but felt to be grossly functional with some modified food textures. Post prandial coughing was demonstrated with nectar thick liquids. No overt aspiration signs demonstrated with honey thick liquids and pureed foods.

## 2017-12-21 NOTE — SWALLOW BEDSIDE ASSESSMENT ADULT - ORAL PREPARATORY PHASE
Pt was passive/distractible when PO offered but anticipatory mouth opening was noted. Moreover, labial grading on utensils was grossly functional.

## 2017-12-21 NOTE — SWALLOW BEDSIDE ASSESSMENT ADULT - ASR SWALLOW RECOMMEND DIAG
DEFER MBS AS PT APPEARED CLINICALLY TOLERANT OF SUGGESTED PO TEXTURES FROM AN OROPHARYNGEAL SWALLOWING STANCE, DYSPHAGIA WITH PROPENSITY TO FLUCTUATE IN SEVERITY GIVEN PROFILE AND CONSIDERING HER ALTERED MENTATION.

## 2017-12-21 NOTE — PROGRESS NOTE ADULT - ASSESSMENT
89 y/o F with PMHx of dementia and HTN presents to the ED from [facility] assisted living (dementia unit) found to have subacute CVA in the posterior left temporal occipital lobes as seen on CT head and UTI.      *Acute metabolic encephalopathy on top of baseline severe dementia- suspect 2/2 UTI and Subacute CVA.   - Per daughter pts baseline mental status is advanced dementia, [place], minimally verbal , variable po intake, calm and cooperative.  Pt was aggitated on admission, more calm now.    - Was initially given haldol x 1 dose and seroquel 25mg Hs on 12/20, stopped now d/t improvement in mental status.    - Will c/w 1:1 today.   - Per discussion with pts daughter, plan is for conservative mgmt of CVA- no MRI/A planned.  Would not want pt to be anticoagulated.    - Echo done will follow up  - treat for UTI  - ASA  - Statin  - Speech and swallow appreicated- dysphagia 1 w honey thick liquids  - Continue with tele monitoring for now- had episode 2:1 block  - PT  - Palliative care    *UTI  - C/w ceftriaxone day 2  - bcx no growth  - urine cultures not sent from ED. Will not send now as abx already given.    *HTN  - BP meds held initially d/t CVa. Will resume low dose norvasc tomorrow.    *DVT ppx  - Heparin SubQ     Dc planning

## 2017-12-21 NOTE — SWALLOW BEDSIDE ASSESSMENT ADULT - ADDITIONAL RECOMMENDATIONS
1) TOTAL ASSIST IS NEEDED TO FEED AND PT MUST BE ALERT WHEN BEING FED WHICH MAY NOT OFTEN BE THE CASE.     2) NUTRITION FOLLOW UP. PROFILE PLACES PT AT NUTRITION RISK.     3) CONSIDER POTENTIAL BENEFIT OF A PALLIATIVE CARE CONSULT.    4)  ANTICIPATE NEEDS

## 2017-12-21 NOTE — PROGRESS NOTE ADULT - SUBJECTIVE AND OBJECTIVE BOX
Consult Note:   · Provider Specialty	Neurology	    Referral/Consultation:   Initial Consult:  · Requested by Name:	Dr.Behary- Mohan	  · Date/Time:	20-Dec-2017 12:44	  · Reason for Referral/Consultation:	CVA/AMS	      · Subjective and Objective: 	  Patient is a 89y old  Female who presents with a chief complaint of AMS  yesterday from [facility] .      HPI:  89 y/o F with PMHx of dementia and HTN presents to the ED from [facility] assisted living (dementia unit) with change in mental status. Per ED chart review, as per EMS, pt is at baseline, but pt has not been eating or drinking. Pt is a poor historian due to dementia.  Patient mumbles answers to questions and nods her head in response to questions however does not follow directions, which is patient's baseline . Information obtained from ER and admission note. CT reported abnormal and neurology consult requested.    While in the ED patient was noted to have CT head positive for subacute stroke and UTI.  Patient was given ASA 325mg x1 and ceftriaxone.     12/21/17 : Pt sleeping with 1: 1 observation, unable to do full exam. As per Nurse family decided for comfort care and doesn't want any extensive testing.      PAST MEDICAL & SURGICAL HISTORY:  HTN (hypertension)  Dementia  No significant past surgical history    FAMILY HISTORY:  No pertinent family history in first degree relatives      Social Hx:  Nonsmoker, no drug or alcohol use    MEDICATIONS  (STANDING):  aspirin enteric coated 325 milliGRAM(s) Oral daily  atorvastatin 40 milliGRAM(s) Oral at bedtime  cefTRIAXone Injectable 1000 milliGRAM(s) IV Push every 24 hours  heparin  Injectable 5000 Unit(s) SubCutaneous every 12 hours  QUEtiapine 25 milliGRAM(s) Oral at bedtime      Vital Signs Last 24 Hrs  T(C): 36.8 (21 Dec 2017 05:20), Max: 37 (20 Dec 2017 20:01)  T(F): 98.2 (21 Dec 2017 05:20), Max: 98.6 (20 Dec 2017 20:01)  HR: 78 (21 Dec 2017 05:20) (78 - 83)  BP: 156/77 (21 Dec 2017 05:20) (142/71 - 170/79)  BP(mean): 98 (21 Dec 2017 05:20) (98 - 98)  RR: 17 (20 Dec 2017 20:50) (17 - 18)  SpO2: 100% (21 Dec 2017 05:20) (96% - 100%)    Constitutional: Pt sleeping, unable to do full exam.  HEENT: PERRl, can not asess  Neck: Supple.  Respiratory: Breath sounds are clear bilaterally  Cardiovascular: S1 and S2, regular  rhythm  Gastrointestinal: soft, nontender  Extremities:  no edema  Vascular:   carotid Bruit - no  Musculoskeletal: no abnormal movements  Skin: No rashes    Neurological exam:  HF: sleeping can not asess speech or mS.  CN:  Can not do full exam .  Motor:  Strength can not be asessed , moves both UE > than Le randomly.  Sens:  can not asess  Reflexes: +2 BUE.+1 to 0 BLE , downgoing toes b/l  Coord:   can not asess  Gait/Balance: Cannot test                        15.1   7.7   )-----------( 256      ( 19 Dec 2017 16:21 )             47.2     12-20    141  |  107  |  17  ----------------------------<  93  3.9   |  27  |  0.71    Ca    8.9      20 Dec 2017 06:26    TPro  8.1  /  Alb  3.4  /  TBili  0.5  /  DBili  x   /  AST  20  /  ALT  21  /  AlkPhos  77  12-19 12-20 [id number] 5.8    12-20 Chol 247<H> <H> HDL 42 Trig 121    Radiology report:  - CT Head 12/19/17    IMPRESSION:  Mildly motion degraded.    Suspect subacute infarct posterior left temporal occipital lobes as   above. No evidence of acute hemorrhage. Extensive chronic microvascular   changes.

## 2017-12-22 PROCEDURE — 99233 SBSQ HOSP IP/OBS HIGH 50: CPT

## 2017-12-22 RX ORDER — QUETIAPINE FUMARATE 200 MG/1
12.5 TABLET, FILM COATED ORAL AT BEDTIME
Qty: 0 | Refills: 0 | Status: DISCONTINUED | OUTPATIENT
Start: 2017-12-22 | End: 2017-12-23

## 2017-12-22 RX ORDER — QUETIAPINE FUMARATE 200 MG/1
25 TABLET, FILM COATED ORAL
Qty: 0 | Refills: 0 | Status: DISCONTINUED | OUTPATIENT
Start: 2017-12-22 | End: 2017-12-22

## 2017-12-22 RX ORDER — QUETIAPINE FUMARATE 200 MG/1
25 TABLET, FILM COATED ORAL DAILY
Qty: 0 | Refills: 0 | Status: DISCONTINUED | OUTPATIENT
Start: 2017-12-22 | End: 2017-12-23

## 2017-12-22 RX ADMIN — HEPARIN SODIUM 5000 UNIT(S): 5000 INJECTION INTRAVENOUS; SUBCUTANEOUS at 05:56

## 2017-12-22 RX ADMIN — QUETIAPINE FUMARATE 12.5 MILLIGRAM(S): 200 TABLET, FILM COATED ORAL at 20:01

## 2017-12-22 RX ADMIN — QUETIAPINE FUMARATE 25 MILLIGRAM(S): 200 TABLET, FILM COATED ORAL at 15:48

## 2017-12-22 RX ADMIN — AMLODIPINE BESYLATE 2.5 MILLIGRAM(S): 2.5 TABLET ORAL at 05:55

## 2017-12-22 RX ADMIN — HEPARIN SODIUM 5000 UNIT(S): 5000 INJECTION INTRAVENOUS; SUBCUTANEOUS at 17:13

## 2017-12-22 RX ADMIN — ATORVASTATIN CALCIUM 40 MILLIGRAM(S): 80 TABLET, FILM COATED ORAL at 20:00

## 2017-12-22 RX ADMIN — Medication 325 MILLIGRAM(S): at 12:38

## 2017-12-22 NOTE — GOALS OF CARE CONVERSATION - PERSONAL ADVANCE DIRECTIVE - TREATMENT GUIDELINE COMMENT
MOLST- DNR, comfort measures, DNI, do not send, no feeding tube, no IVF, determine use of antibiotics. Plan to see if longterm will take her back and proceed from there with help of SW team.

## 2017-12-22 NOTE — PROGRESS NOTE ADULT - SUBJECTIVE AND OBJECTIVE BOX
Consult Note:   · Provider Specialty	Neurology	    Referral/Consultation:   Initial Consult:  · Requested by Name:	Dr.Behary- Mohan	  · Date/Time:	20-Dec-2017 12:44	  · Reason for Referral/Consultation:	CVA/AMS	      · Subjective and Objective: 	  Patient is a 89y old  Female who presents with a chief complaint of AMS  yesterday from [facility] .      HPI:  89 y/o F with PMHx of dementia and HTN presents to the ED from [facility] assisted living (dementia unit) with change in mental status. Per ED chart review, as per EMS, pt is at baseline, but pt has not been eating or drinking. Pt is a poor historian due to dementia.  Patient mumbles answers to questions and nods her head in response to questions however does not follow directions, which is patient's baseline . Information obtained from ER and admission note. CT reported abnormal and neurology consult requested.  While in the ED patient was noted to have CT head positive for subacute stroke and UTI.  Patient was given ASA 325mg x1 and ceftriaxone. (20 Dec 2017 01:49)    12/22/17 : pt more alert ,non verbal, sitting in chair,  pleasant, offers no c/o. laughs  when asked any questions, appears comfortable. Seen by Palliative care. MRI's were cancelled.       PAST MEDICAL & SURGICAL HISTORY:  HTN (hypertension)  Dementia  No significant past surgical history    FAMILY HISTORY:  No pertinent family history in first degree relatives      Social Hx:  Nonsmoker, no drug or alcohol use  MEDICATIONS  (STANDING):  amLODIPine   Tablet 2.5 milliGRAM(s) Oral daily  aspirin enteric coated 325 milliGRAM(s) Oral daily  atorvastatin 40 milliGRAM(s) Oral at bedtime  cefTRIAXone Injectable 1000 milliGRAM(s) IV Push every 24 hours  heparin  Injectable 5000 Unit(s) SubCutaneous every 12 hours      Vital Signs Last 24 Hrs  T(C): 36.4 (22 Dec 2017 04:35), Max: 36.9 (21 Dec 2017 16:48)  T(F): 97.5 (22 Dec 2017 04:35), Max: 98.4 (21 Dec 2017 16:48)  HR: 79 (22 Dec 2017 04:35) (79 - 90)  BP: 138/89 (22 Dec 2017 04:35) (138/89 - 151/69)  BP(mean): --  RR: 18 (21 Dec 2017 10:51) (18 - 18)  SpO2: 93% (22 Dec 2017 04:35) (93% - 98%)        Constitutional: awake and alert , calm, nonverbal.   HEENT: PERRLA, resopnds to threat bilaterally.   Neck: Supple.  Respiratory: Breath sounds are clear bilaterally  Cardiovascular: S1 and S2, regular  rhythm  Gastrointestinal: soft, nontender  Extremities:  no edema  Vascular:   carotid Bruit - no  Musculoskeletal: no abnormal movements  Skin: No rashes    Neurological exam:  HF: Awake, alert, non verbal, , doesn't follow commands.  CN: Eugenie, responds to threat, no facial weakness gag intact .  Motor:  Strength 5/5 , Bilaterally..  Sens:  can not asess  Reflexes: +2 BUE.+1 to 0 BLE , downgoing toes b/l  Coord:   can not asess  Gait/Balance: OOB ambulated with PT.      12-20 [id number] 5.8    12-20 Chol 247<H> <H> HDL 42 Trig 121    Radiology report:  - CT Head 12/19/17  IMPRESSION:  Mildly motion degraded.    Suspect subacute infarct posterior left temporal occipital lobes as   above. No evidence of acute hemorrhage. Extensive chronic microvascular   changes.

## 2017-12-22 NOTE — PROGRESS NOTE ADULT - ASSESSMENT
Assessment and Plan:   Assessment:  · Assessment		  89 y/o F with PMHx of dementia and HTN presents to the ED from [facility] assisted living (dementia unit) found to have subacute CVA in the posterior left temporal occipital lobes as seen on CT head and UTI.     CVA (cerebral vascular accident).  Underlying Dementia.     -Admit to telemetry  -CT head - subacute infarction in the posterior left temporal occipital lobes, no acute hemorrhage  -continue  mg daily  -atorvastatin 40mg  -MRI/ MRA brain , carotids. were cancelled.  Correct underlying metabolic causes.  - Seen by Palliative care.  - D/c planning.  -will sign off the case .Reconsult as needed.

## 2017-12-22 NOTE — GOALS OF CARE CONVERSATION - PERSONAL ADVANCE DIRECTIVE - CONVERSATION DETAILS
Met with family to discuss medical issues and GOC. They explained that pt has been declining over the years, now in [facility] for one year. They note her incontinence and need for help with ADLs. At baseline, pt is said to be a calm and pleasantly confused person. She is finally starting to recognize and be used to her surroundings at [facility] and thus they would want her to go back there. They explain though that funds are getting low and we discussed options of working with SW at [facility] to start medicaid process.     Family is fully aware of all medical issues and chronic progressive nature of underlying dementia as well. At this point, they just want to focus on comfort, noting this is also the pt's expressed wishes. Thus, we discussed home hospice and what this benefit came along with. They agreed this would be worthwhile and want to initiate process with whomever facility works with if they are willing to take pt back. We also reviewed MOLST form, noting no IVF and determine use of antibiotics as additional choices (left blank previously, updated and initialled today).     Spoke with Dr. Contreras and SOWMYA directly after this meeting and some concern about whether facility would be willing to take pt back with hospice services. SOWMYA Lee to follow up with [facility] and also with family if this is not an option at this point, in which case plan for [facility] with comfort MOLST with hope for transition to [facility] with hospice later, remains a backup option.

## 2017-12-22 NOTE — PROGRESS NOTE ADULT - ASSESSMENT
89 y/o F with PMHx of dementia and HTN presents to the ED from [facility] (dementia unit) found to have subacute CVA in the posterior left temporal occipital lobes as seen on CT head and UTI.  GOC for treatement of UTI and comfort based care.    *Acute metabolic encephalopathy on top of baseline severe dementia- suspect 2/2 UTI and Subacute CVA.   - Per daughter pts baseline mental status is advanced dementia, [place], minimally verbal , variable po intake, calm and cooperative.  Pt intermittently with episodes of aaggitation/restlessness.  - Was initially given haldol x 1 dose and seroquel 25mg Hs on 12/20 with good effect.  Continue seroquel 12.5 HS and add 25mg once daily PRN.   - 1:1 dc'd  - Per discussion with pts daughter, plan is for conservative mgmt of CVA- no MRI/A planned.  Would not want pt to be anticoagulated.    - Echo EF 60-65%  - treated for UTI  - ASA  - Statin  - Speech and swallow appreicated- dysphagia 1 w honey thick liquids  - Continue with tele monitoring for now- had episode 2:1 block  - PT  - Palliative care appreicated    *UTI  - Completed 3 days rocephin- dc'd.  - bcx no growth  - urine cultures not sent from ED. Will not send now as abx already given.    *HTN  - BP meds held initially d/t CVa. resumed on low dose norvasc.    *DVT ppx  - Heparin SubQ     Complicated Dc planning.

## 2017-12-22 NOTE — PROGRESS NOTE ADULT - SUBJECTIVE AND OBJECTIVE BOX
CHIEF COMPLAINT: AMS    SUBJECTIVE: Pt weak. Calm this AM, verbalizing, following commands.  became somewhat aggitated/restless in the afternoon.  Pall care involement appreciated.    REVIEW OF SYSTEMS: All other review of systems is negative unless indicated above    Vital Signs Last 24 Hrs  T(C): 36.6 (22 Dec 2017 10:46), Max: 36.9 (21 Dec 2017 16:48)  T(F): 97.8 (22 Dec 2017 10:46), Max: 98.4 (21 Dec 2017 16:48)  HR: 96 (22 Dec 2017 10:46) (79 - 96)  BP: 148/73 (22 Dec 2017 10:46) (138/89 - 148/73)  BP(mean): --  RR: 17 (22 Dec 2017 10:46) (17 - 17)  SpO2: 95% (22 Dec 2017 10:46) (93% - 98%)    PHYSICAL EXAM:  Constitutional: frail elderly female, calm, in NAD  HEENT: EOMI, NCAT  Neck: Soft and supple  Respiratory: Lungs grossly clear to auscultation  Cardiovascular: S1S2, RRR  Gastrointestinal: soft, nondistended, no guarding or rigidity  Extremities: No peripheral edema  Vascular: 2+ peripheral pulses  Neurological: follows commands, limited exam  Musculoskeletal: moving all extremities  Skin: No rashes    MEDICATIONS:  MEDICATIONS  (STANDING):  amLODIPine   Tablet 2.5 milliGRAM(s) Oral daily  aspirin enteric coated 325 milliGRAM(s) Oral daily  atorvastatin 40 milliGRAM(s) Oral at bedtime  heparin  Injectable 5000 Unit(s) SubCutaneous every 12 hours  QUEtiapine 12.5 milliGRAM(s) Oral at bedtime    LABS: All Labs Reviewed:    Blood Culture: 12-19 @ 16:21  Organism --  Gram Stain Blood -- Gram Stain --  Specimen Source .Blood None  Culture-Blood --

## 2017-12-22 NOTE — GOALS OF CARE CONVERSATION - PERSONAL ADVANCE DIRECTIVE - TREATMENT GUIDELINES
DNR Order/No artificial nutrition/No IV fluids/Comfort measures only/DNI/Do not re-hospitalize/Antibiotic trial

## 2017-12-22 NOTE — PROGRESS NOTE ADULT - SUBJECTIVE AND OBJECTIVE BOX
HPI: Pt seen and examined this am in follow up for sx and GOC. Pt sitting up in chair, much more awake today. Only notes she is "trying to get ahead. " Unable to expand on this, appears comfortable. Fidgeting with gown, but redirectable and follows commands.       PAIN: denies    DYSPNEA: denies      ROS:    Limited by dementia      PHYSICAL EXAM:    Vital Signs Last 24 Hrs  T(C): 36.6 (22 Dec 2017 10:46), Max: 36.9 (21 Dec 2017 16:48)  T(F): 97.8 (22 Dec 2017 10:46), Max: 98.4 (21 Dec 2017 16:48)  HR: 96 (22 Dec 2017 10:46) (79 - 96)  BP: 148/73 (22 Dec 2017 10:46) (138/89 - 148/73)  BP(mean): --  RR: 17 (22 Dec 2017 10:46) (17 - 17)  SpO2: 95% (22 Dec 2017 10:46) (93% - 98%)  Daily     Daily       PPSV2: 20-30  %  FAST: 7    General: Elderly female sitting up in chair, pleasantly confused  Mental Status: responds to name, unable to answer further questions  HEENT: mmm, perrl  Lungs: clear  Cardiac: +s1 s2 rrr  GI: soft nt nd +bs  : incontinent  Ext: no edema  Neuro: other than cognition, moves all extremities, allows exam    LABS:            Albumin: Albumin, Serum: 3.4 g/dL (12-19 @ 16:21)      Allergies    No Known Allergies    Intolerances      MEDICATIONS  (STANDING):  amLODIPine   Tablet 2.5 milliGRAM(s) Oral daily  aspirin enteric coated 325 milliGRAM(s) Oral daily  atorvastatin 40 milliGRAM(s) Oral at bedtime  heparin  Injectable 5000 Unit(s) SubCutaneous every 12 hours    MEDICATIONS  (PRN):  QUEtiapine 25 milliGRAM(s) Oral at bedtime PRN agitation      RADIOLOGY:

## 2017-12-22 NOTE — PROGRESS NOTE ADULT - ASSESSMENT
89y old Female coming from [facility] (dementia unit) with hx of advanced dementia (FAST7, few words, incontinent), 4 ER visits this year, and HTN, admitted 12/19 for altered mental status and decreased po intake. Found to have +UTI and CTH showing subacute stroke in posterior Left temporal occipital lobes. Family not desiring further workup only tx of infection. Palliative Care consulted to help establish GOC.     1) AMS/ new stroke  - baseline dementia, as per EMR few words  - also c/b infection and new stroke  - neurology notes appreciated  - family not wanting further imaging  - s/p haldol x1 and seroquel 25 with good result, calm today  - added seroquel 25 mg qhs prn, no need for this thus far    2) UTI  - on abx for this  - no fevers or inc wbc    3) Debility  - PPS<40%  - did some work with PT today    4) Prognosis  - poor  - in light of advanced age, advanced dementia, new stroke, and infection, with multiple hospital visits, and desire to focus on comfort, PPS<40%, would fit criteria for hospice    5) GOC/Advanced Directives  - pt does not have capacity for decision making  - HCP on chart noting daughter Lisa Kellogg ([phone]) and Flo Owen  - DNR and DNI, confirmed today  -MOLST also found on portal noting DNR, DNI, comfort measures, do not send, no feeding tube decisions from 2015, but no such paperwork from [facility] (though checked off as completed on their paperwork) to evaluate last confirmation of these wishes. Daughter brought official MOLST for our review today, same decisions along with no IVF and determine use of antibiotics  - GOC meeting held today- MOLST reviewed as above, family would like pt to return to [facility] with hospice in place, SW looking into this possibility. If unable due to mobility issues, will need to go to [facility] as transition. Awaiting decision of facility. Above d/w Dr. Contreras. Spent 32 minutes discussing GOC with family including Advance care planning, explanation and discussion of advance directives, reviewed MOLST and finalized DNR/DNI form. See GOC note for further details.    Thank you for including us in Ms. Eden's care. Will continue to follow with you.    Hemant Gallegos MD  Palliative Care Attending

## 2017-12-23 LAB
ANION GAP SERPL CALC-SCNC: 9 MMOL/L — SIGNIFICANT CHANGE UP (ref 5–17)
BUN SERPL-MCNC: 18 MG/DL — SIGNIFICANT CHANGE UP (ref 7–23)
CALCIUM SERPL-MCNC: 9.1 MG/DL — SIGNIFICANT CHANGE UP (ref 8.5–10.1)
CHLORIDE SERPL-SCNC: 108 MMOL/L — SIGNIFICANT CHANGE UP (ref 96–108)
CO2 SERPL-SCNC: 25 MMOL/L — SIGNIFICANT CHANGE UP (ref 22–31)
CREAT SERPL-MCNC: 0.71 MG/DL — SIGNIFICANT CHANGE UP (ref 0.5–1.3)
GLUCOSE SERPL-MCNC: 86 MG/DL — SIGNIFICANT CHANGE UP (ref 70–99)
HCT VFR BLD CALC: 43.3 % — SIGNIFICANT CHANGE UP (ref 34.5–45)
HGB BLD-MCNC: 13.7 G/DL — SIGNIFICANT CHANGE UP (ref 11.5–15.5)
MCHC RBC-ENTMCNC: 28.6 PG — SIGNIFICANT CHANGE UP (ref 27–34)
MCHC RBC-ENTMCNC: 31.7 GM/DL — LOW (ref 32–36)
MCV RBC AUTO: 90.4 FL — SIGNIFICANT CHANGE UP (ref 80–100)
PLATELET # BLD AUTO: 157 K/UL — SIGNIFICANT CHANGE UP (ref 150–400)
POTASSIUM SERPL-MCNC: 4 MMOL/L — SIGNIFICANT CHANGE UP (ref 3.5–5.3)
POTASSIUM SERPL-SCNC: 4 MMOL/L — SIGNIFICANT CHANGE UP (ref 3.5–5.3)
RBC # BLD: 4.79 M/UL — SIGNIFICANT CHANGE UP (ref 3.8–5.2)
RBC # FLD: 13.6 % — SIGNIFICANT CHANGE UP (ref 10.3–14.5)
SODIUM SERPL-SCNC: 142 MMOL/L — SIGNIFICANT CHANGE UP (ref 135–145)
TROPONIN I SERPL-MCNC: <0.015 NG/ML — SIGNIFICANT CHANGE UP (ref 0.01–0.04)
TSH SERPL-MCNC: 1.05 UU/ML — SIGNIFICANT CHANGE UP (ref 0.36–3.74)
VIT B12 SERPL-MCNC: 729 PG/ML — SIGNIFICANT CHANGE UP (ref 232–1245)
WBC # BLD: 7.2 K/UL — SIGNIFICANT CHANGE UP (ref 3.8–10.5)
WBC # FLD AUTO: 7.2 K/UL — SIGNIFICANT CHANGE UP (ref 3.8–10.5)

## 2017-12-23 PROCEDURE — 99233 SBSQ HOSP IP/OBS HIGH 50: CPT

## 2017-12-23 RX ORDER — QUETIAPINE FUMARATE 200 MG/1
12.5 TABLET, FILM COATED ORAL EVERY 8 HOURS
Qty: 0 | Refills: 0 | Status: DISCONTINUED | OUTPATIENT
Start: 2017-12-23 | End: 2017-12-27

## 2017-12-23 RX ADMIN — HEPARIN SODIUM 5000 UNIT(S): 5000 INJECTION INTRAVENOUS; SUBCUTANEOUS at 17:55

## 2017-12-23 RX ADMIN — AMLODIPINE BESYLATE 2.5 MILLIGRAM(S): 2.5 TABLET ORAL at 06:27

## 2017-12-23 RX ADMIN — ATORVASTATIN CALCIUM 40 MILLIGRAM(S): 80 TABLET, FILM COATED ORAL at 22:18

## 2017-12-23 RX ADMIN — HEPARIN SODIUM 5000 UNIT(S): 5000 INJECTION INTRAVENOUS; SUBCUTANEOUS at 06:27

## 2017-12-23 NOTE — PROGRESS NOTE ADULT - SUBJECTIVE AND OBJECTIVE BOX
CHIEF COMPLAINT: AMS    SUBJECTIVE:  12/22 Pt weak. Calm this AM, verbalizing, following commands.  became somewhat aggitated/restless in the afternoon.  Pall care involement appreciated.  12/23 pt weak, lethargic  s/p Seroquel last night, denies pain, confused , tele - in sinus    REVIEW OF SYSTEMS: All other review of systems is negative unless indicated above    Vital Signs Last 24 Hrs  T(C): 36.3 (12-23-17 @ 10:15), Max: 36.9 (12-23-17 @ 05:44)  T(F): 97.3 (12-23-17 @ 10:15), Max: 98.4 (12-23-17 @ 05:44)  HR: 87 (12-23-17 @ 10:15) (87 - 106)  BP: 115/63 (12-23-17 @ 10:15) (115/58 - 129/68)  RR: 16 (12-23-17 @ 10:15) (16 - 18)  SpO2: 96% (12-23-17 @ 10:15) (95% - 99%)  Wt(kg): --    PHYSICAL EXAM:  Constitutional: frail elderly female, calm, in NAD  HEENT: EOMI, NCAT  Neck: Soft and supple  Respiratory: Lungs grossly clear to auscultation  Cardiovascular: S1S2, RRR  Gastrointestinal: soft, nondistended, no guarding or rigidity  Extremities: No peripheral edema  Vascular: 2+ peripheral pulses  Neurological: follows commands, limited exam  Musculoskeletal: moving all extremities  Skin: No rashes    MEDICATIONS  (STANDING):  amLODIPine   Tablet 2.5 milliGRAM(s) Oral daily  aspirin enteric coated 325 milliGRAM(s) Oral daily  atorvastatin 40 milliGRAM(s) Oral at bedtime  heparin  Injectable 5000 Unit(s) SubCutaneous every 12 hours    MEDICATIONS  (PRN):  QUEtiapine 12.5 milliGRAM(s) Oral every 8 hours PRN aggitation  LABS: All Labs Reviewed:  Comprehensive Metabolic Panel (12.19.17 @ 16:21)    Sodium, Serum: 143 mmol/L    Potassium, Serum: 4.2 mmol/L    Chloride, Serum: 107 mmol/L    Carbon Dioxide, Serum: 29 mmol/L    Anion Gap, Serum: 7 mmol/L    Blood Urea Nitrogen, Serum: 21 mg/dL    Creatinine, Serum: 0.83 mg/dL    Glucose, Serum: 99 mg/dL    Calcium, Total Serum: 9.5 mg/dL    Protein Total, Serum: 8.1 gm/dL    Albumin, Serum: 3.4 g/dL    Bilirubin Total, Serum: 0.5 mg/dL    Alkaline Phosphatase, Serum: 77 U/L    Aspartate Aminotransferase (AST/SGOT): 20 U/L    Alanine Aminotransferase (ALT/SGPT): 21 U/L     Complete Blood Count + Automated Diff (12.19.17 @ 16:21)    WBC Count: 7.7 K/uL    RBC Count: 5.24 M/uL    Hemoglobin: 15.1 g/dL    Hematocrit: 47.2 %    Mean Cell Volume: 90.2 fl    Mean Cell Hemoglobin: 28.8 pg    Mean Cell Hemoglobin Conc: 31.9 gm/dL    Red Cell Distrib Width: 13.2 %    Platelet Count - Automated: 256 K/uL    Auto Neutrophil #: 5.1 K/uL    Auto Lymphocyte #: 1.3 K/uL    Auto Monocyte #: 0.9 K/uL    Auto Eosinophil #: 0.2 K/uL    Auto Basophil #: 0.1 K/uL    Auto Neutrophil %: 66.7: Differential percentages must be correlated with absolute numbers for  clinical significance. %    Auto Lymphocyte %: 16.4 %    Auto Monocyte %: 12.3 %    Auto Eosinophil %: 3.0 %    Auto Basophil %: 1.6 %    Lipid Profile (12.20.17 @ 06:26)    Total Cholesterol/HDL Ratio Measurement: 5.9 RATIO    Cholesterol, Serum: 247 mg/dL    Triglycerides, Serum: 121 mg/dL    HDL Cholesterol, Serum: 42 mg/dL    Direct LDL: 181: LDL Cholesterol --- Interpretive Comment (for adults 18 and over)      < from: Transthoracic Echocardiogram (12.21.17 @ 10:24) >  EA reversal of the mitral inflow consistent with reduced compliance of   the   left ventricle.   Trace mitral regurgitation is present.   Fibrocalcific changes notedto the Aortic valve leaflets with preserved   leaflet excursion.   Normal appearing tricuspid valve structure and function.   Trace tricuspid valve regurgitation is present.   Normal appearing pulmonic valve structure and function.   Normal appearingleft atrium.   The left ventricle is normal in size, wall motion and contractility.   Mild concentric left ventricular hypertrophy is present.   Estimated left ventricular ejection fraction is 60-65 %.   Normal appearing right atrium.   Normal appearing right ventricle structure and function.   All visualized extra cardiac structures appears to be normal.   No evidence of pericardial effusion.   No evidence of pleural effusion.      < end of copied text >  < from: CT Head No Cont (12.19.17 @ 17:39) >  IMPRESSION:  Mildly motion degraded.    Suspect subacute infarct posterior left temporal occipital lobes as   above. No evidence of acute hemorrhage. Extensive chronic microvascular   changes.      < end of copied text >  < from: Xray Chest 1 View AP/PA. (12.19.17 @ 17:31) >  : Clear lungs.      < end of copied text >    < from: 12 Lead ECG (12.19.17 @ 16:39) >  Diagnosis Line Sinus rhythm with 1st degree A-V block  Right bundle branch block  Left anterior fascicular block  *** Bifascicular block ***  Minimal voltage criteria for LVH, may be normal variant  Septal infarct , age undetermined  Abnormal ECG  When compared with ECG of 30-AUG-2017 16:28,  No significant change was found    < end of copied text >

## 2017-12-23 NOTE — PROGRESS NOTE ADULT - SUBJECTIVE AND OBJECTIVE BOX
Consult Note:   · Provider Specialty	Neurology	    Referral/Consultation:   Initial Consult:  · Requested by Name:	Dr.Behary- Mohan	  · Date/Time:	20-Dec-2017 12:44	  · Reason for Referral/Consultation:	CVA/AMS	      · Subjective and Objective: 	  Patient is a 89y old  Female who presents with a chief complaint of AMS  yesterday from [facility] .      HPI:  89 y/o F with PMHx of dementia and HTN presents to the ED from [facility] assisted living (dementia unit) with change in mental status. Per ED chart review, as per EMS, pt is at baseline, but pt has not been eating or drinking. Pt is a poor historian due to dementia.  Patient mumbles answers to questions and nods her head in response to questions however does not follow directions, which is patient's baseline . Information obtained from ER and admission note. CT reported abnormal and neurology consult requested.  While in the ED patient was noted to have CT head positive for subacute stroke and UTI.  Patient was given ASA 325mg x1 and ceftriaxone. (20 Dec 2017 01:49)    12/22/17 : pt more alert ,non verbal, sitting in chair,  pleasant, offers no c/o. laughs  when asked any questions, appears comfortable. Seen by Palliative care. MRI's were cancelled.     12/23/17 : Pt sleeping Given seroquel  for agitation and no c/o per staff.     PAST MEDICAL & SURGICAL HISTORY:  HTN (hypertension)  Dementia  No significant past surgical history    FAMILY HISTORY:  No pertinent family history in first degree relatives      Social Hx:  Nonsmoker, no drug or alcohol use    MEDICATIONS  (STANDING):  amLODIPine   Tablet 2.5 milliGRAM(s) Oral daily  aspirin enteric coated 325 milliGRAM(s) Oral daily  atorvastatin 40 milliGRAM(s) Oral at bedtime  heparin  Injectable 5000 Unit(s) SubCutaneous every 12 hours  QUEtiapine 12.5 milliGRAM(s) Oral at bedtime      Vital Signs Last 24 Hrs  T(C): 36.9 (23 Dec 2017 05:44), Max: 36.9 (23 Dec 2017 05:44)  T(F): 98.4 (23 Dec 2017 05:44), Max: 98.4 (23 Dec 2017 05:44)  HR: 93 (23 Dec 2017 05:44) (93 - 106)  BP: 129/68 (23 Dec 2017 05:44) (115/58 - 148/73)  BP(mean): --  RR: 17 (23 Dec 2017 05:44) (17 - 18)  SpO2: 99% (23 Dec 2017 05:44) (95% - 99%)    Constitutional: sleeping in bed, sedated.   HEENT: PERRLA, resopnds to threat bilaterally.   Neck: Supple.  Respiratory: Breath sounds are clear bilaterally  Cardiovascular: S1 and S2, regular  rhythm  Gastrointestinal: soft, nontender  Extremities:  no edema  Vascular:   carotid Bruit - no  Musculoskeletal: no abnormal movements  Skin: No rashes    Neurological exam:  HF: sleeping , sedated, doesn't follow commands.  CN: Eugenie, responds to threat, no facial weakness gag intact .  Motor:  moves john xtremities to stimuli...  Sens:  can not asess  Reflexes: +2 BUE.+1 to 0 BLE , downgoing toes b/l  Coord:   can not asess  Gait/Balance: can  not asess .        12-20 [id number] 5.8    12-20 Chol 247<H> <H> HDL 42 Trig 121    Radiology report:  - CT Head 12/19/17  IMPRESSION:  Mildly motion degraded.    Suspect subacute infarct posterior left temporal occipital lobes as   above. No evidence of acute hemorrhage. Extensive chronic microvascular   changes.

## 2017-12-23 NOTE — PROGRESS NOTE ADULT - ASSESSMENT
· Assessment		  89 y/o F with PMHx of dementia and HTN presents to the ED from [facility] assisted living (dementia unit) found to have subacute CVA in the posterior left temporal occipital lobes as seen on CT head and UTI.     CVA (cerebral vascular accident).  Underlying Dementia.     -Admit to telemetry  -CT head - subacute infarction in the posterior left temporal occipital lobes, no acute hemorrhage  -continue  mg daily  -atorvastatin 40mg  -MRI/ MRA brain , carotids. were cancelled.  Correct underlying metabolic causes.  - Seen by Palliative care.  - D/c planning.  -will sign off the case .Reconsult as needed.

## 2017-12-23 NOTE — PROGRESS NOTE ADULT - ASSESSMENT
89 y/o F with PMHx of dementia and HTN presents to the ED from [facility] found to have subacute CVA in the posterior left temporal occipital lobes as seen on CT head and UTI.  GOC for treatement of UTI and comfort based care.    *Acute metabolic encephalopathy on top of baseline severe dementia- suspect 2/2 UTI and Subacute CVA.   - Per daughter pts baseline mental status is advanced dementia, [place], minimally verbal , variable po intake, calm and cooperative.  Pt intermittently with episodes of aaggitation/restlessness.  - Was initially given haldol x 1 dose and seroquel 25mg Hs on 12/20 with good effect.  Continue seroquel 12.5 HS and add 25mg once daily PRN.   - 1:1 dc'd  - Per discussion with pts daughter, plan is for conservative mgmt of CVA- no MRI/A planned.  Would not want pt to be anticoagulated.    - Echo EF 60-65%  - treated for UTI  - ASA  - Statin  - Speech and swallow appreicated- dysphagia 1 w honey thick liquids  - Continue with tele monitoring for now- had episode 2:1 block  - PT  - Palliative care appreicated    *UTI  - Completed 3 days rocephin- dc'd.  - bcx no growth  - urine cultures not sent from ED. Will not send now as abx already given.    *HTN  - BP meds held initially d/t CVa. resumed on low dose norvasc.    *DVT ppx  - Heparin SubQ     Complicated Dc planning. 89 y/o F with PMHx of dementia and HTN presents to the ED on 12/19/17  from [facility] assisted living (dementia unit) found to have subacute CVA in the posterior left temporal occipital lobes as seen on CT head and UTI.  GOC for treatement of UTI and comfort based care.    *Acute metabolic encephalopathy on top of baseline severe dementia- suspect 2/2 UTI and Subacute CVA, ischemic  - Per daughter pts baseline mental status is advanced dementia, [place], minimally verbal , variable po intake, calm and cooperative.  Pt intermittently with episodes of aaggitation/restlessness.  - Was initially given haldol x 1 dose and seroquel 25mg Hs on 12/20 with good effect.  Continue seroquel 12.5 HS and add 25mg once daily PRN.   - 1:1 dc'd  - Per discussion with pts daughter, plan is for conservative mgmt of CVA- no MRI/A planned.  Would not want pt to be anticoagulated.    - Echo EF 60-65%  - treated for UTI  - ASA.  Statin  - check TSH, B12  - Speech and swallow appreciated dysphagia 1 w honey thick liquids  - tele monitoring - had episode 2:1 block, now in sinus no events  - PT  - Palliative care appreciated - DNR/DNI, comfort measures    * HLD  - statins  - monitoring    *UTI  - Completed 3 days rocephin- dc'd.  - bcx no growth  - urine cultures not sent from ED. Will not send now as abx already given.    *HTN  - BP meds held initially d/t CVa. resumed on low dose norvasc.    *DVT ppx  - Heparin SubQ     Dispo - can be transfered to me-surg, d/c planning to [facility] with transition to hospice care

## 2017-12-24 LAB
CULTURE RESULTS: SIGNIFICANT CHANGE UP
SPECIMEN SOURCE: SIGNIFICANT CHANGE UP

## 2017-12-24 PROCEDURE — 93010 ELECTROCARDIOGRAM REPORT: CPT

## 2017-12-24 RX ADMIN — QUETIAPINE FUMARATE 12.5 MILLIGRAM(S): 200 TABLET, FILM COATED ORAL at 12:19

## 2017-12-24 RX ADMIN — AMLODIPINE BESYLATE 2.5 MILLIGRAM(S): 2.5 TABLET ORAL at 06:25

## 2017-12-24 RX ADMIN — HEPARIN SODIUM 5000 UNIT(S): 5000 INJECTION INTRAVENOUS; SUBCUTANEOUS at 06:24

## 2017-12-24 RX ADMIN — Medication 325 MILLIGRAM(S): at 12:19

## 2017-12-24 RX ADMIN — HEPARIN SODIUM 5000 UNIT(S): 5000 INJECTION INTRAVENOUS; SUBCUTANEOUS at 18:17

## 2017-12-24 NOTE — PROGRESS NOTE ADULT - ASSESSMENT
89 y/o F with PMHx of dementia and HTN presents to the ED on 12/19/17  from [facility] assisted living (dementia unit) found to have subacute CVA in the posterior left temporal occipital lobes as seen on CT head and UTI.  GOC for treatement of UTI and comfort based care.    *Acute metabolic encephalopathy on top of baseline severe dementia- suspect 2/2 UTI and Subacute CVA, ischemic  - Per daughter pts baseline mental status is advanced dementia, [place], minimally verbal , variable po intake, calm and cooperative.  Pt intermittently with episodes of aaggitation/restlessness.  - Was initially given haldol x 1 dose and seroquel 25mg Hs on 12/20 with good effect.  Continue seroquel 12.5 HS and add 25mg once daily PRN.   - 1:1 dc'd  - Per discussion with pts daughter, plan is for conservative mgmt of CVA- no MRI/A planned.  Would not want pt to be anticoagulated.    - Echo EF 60-65%  - treated for UTI  - ASA.  Statin  - check TSH, B12 - wnl   - Speech and swallow appreciated dysphagia 1 w honey thick liquids  - tele monitoring - had episode 2:1 block, now in sinus no events  - PT  - Palliative care appreciated - DNR/DNI, comfort measures    * HLD  - statins  - monitoring    *UTI  - Completed 3 days rocephin- dc'd.  - bcx no growth  - urine cultures not sent from ED. Will not send now as abx already given.    *HTN  - BP meds held initially d/t CVa. resumed on low dose norvasc.    *DVT ppx  - Heparin SubQ     Dispo - can be transfered to me-surg, d/c planning to [facility] with transition to hospice care

## 2017-12-24 NOTE — PROGRESS NOTE ADULT - SUBJECTIVE AND OBJECTIVE BOX
CHIEF COMPLAINT: AMS    SUBJECTIVE:  12/22 Pt weak. Calm this AM, verbalizing, following commands.  became somewhat aggitated/restless in the afternoon.  Pall care involement appreciated.  12/23 pt weak, lethargic  s/p Seroquel last night, denies pain, confused , tele - in sinus  12/24 - more alert, awake, answering simple questions, poor historian, denies pain, able to move all extremities    REVIEW OF SYSTEMS: All other review of systems is negative unless indicated above    T(C): 36.6 (12-24-17 @ 11:07), Max: 37.3 (12-23-17 @ 20:48)  T(F): 97.9 (12-24-17 @ 11:07), Max: 99.2 (12-23-17 @ 20:48)  HR: 89 (12-24-17 @ 11:07) (85 - 89)  BP: 130/57 (12-24-17 @ 11:07) (117/71 - 132/56)  RR: 16 (12-24-17 @ 11:07) (16 - 18)  SpO2: 98% (12-24-17 @ 11:07) (98% - 98%)  Wt(kg): --    PHYSICAL EXAM:  Constitutional: frail elderly female, calm, in NAD  HEENT: EOMI, NCAT  Neck: Soft and supple  Respiratory: Lungs grossly clear to auscultation  Cardiovascular: S1S2, RRR  Gastrointestinal: soft, nondistended, no guarding or rigidity  Extremities: No peripheral edema  Vascular: 2+ peripheral pulses  Neurological: follows commands, limited exam  Musculoskeletal: moving all extremities  Skin: No rashes    MEDICATIONS  (STANDING):  amLODIPine   Tablet 2.5 milliGRAM(s) Oral daily  aspirin enteric coated 325 milliGRAM(s) Oral daily  atorvastatin 40 milliGRAM(s) Oral at bedtime  heparin  Injectable 5000 Unit(s) SubCutaneous every 12 hours    MEDICATIONS  (PRN):  QUEtiapine 12.5 milliGRAM(s) Oral every 8 hours PRN aggitation    LABS: All Labs Reviewed:  12-23    142  |  108  |  18  ----------------------------<  86  4.0   |  25  |  0.71    Ca    9.1      23 Dec 2017 17:21                        13.7   7.2   )-----------( 157      ( 23 Dec 2017 17:21 )             43.3   CARDIAC MARKERS ( 23 Dec 2017 17:21 )  <0.015 ng/mL / x     / x     / x     / x          Comprehensive Metabolic Panel (12.19.17 @ 16:21)    Sodium, Serum: 143 mmol/L    Potassium, Serum: 4.2 mmol/L    Chloride, Serum: 107 mmol/L    Carbon Dioxide, Serum: 29 mmol/L    Anion Gap, Serum: 7 mmol/L    Blood Urea Nitrogen, Serum: 21 mg/dL    Creatinine, Serum: 0.83 mg/dL    Glucose, Serum: 99 mg/dL    Calcium, Total Serum: 9.5 mg/dL    Protein Total, Serum: 8.1 gm/dL    Albumin, Serum: 3.4 g/dL    Bilirubin Total, Serum: 0.5 mg/dL    Alkaline Phosphatase, Serum: 77 U/L    Aspartate Aminotransferase (AST/SGOT): 20 U/L    Alanine Aminotransferase (ALT/SGPT): 21 U/L     Complete Blood Count + Automated Diff (12.19.17 @ 16:21)    WBC Count: 7.7 K/uL    RBC Count: 5.24 M/uL    Hemoglobin: 15.1 g/dL    Hematocrit: 47.2 %    Mean Cell Volume: 90.2 fl    Mean Cell Hemoglobin: 28.8 pg    Mean Cell Hemoglobin Conc: 31.9 gm/dL    Red Cell Distrib Width: 13.2 %    Platelet Count - Automated: 256 K/uL    Auto Neutrophil #: 5.1 K/uL    Auto Lymphocyte #: 1.3 K/uL    Auto Monocyte #: 0.9 K/uL    Auto Eosinophil #: 0.2 K/uL    Auto Basophil #: 0.1 K/uL    Auto Neutrophil %: 66.7: Differential percentages must be correlated with absolute numbers for  clinical significance. %    Auto Lymphocyte %: 16.4 %    Auto Monocyte %: 12.3 %    Auto Eosinophil %: 3.0 %    Auto Basophil %: 1.6 %    Lipid Profile (12.20.17 @ 06:26)    Total Cholesterol/HDL Ratio Measurement: 5.9 RATIO    Cholesterol, Serum: 247 mg/dL    Triglycerides, Serum: 121 mg/dL    HDL Cholesterol, Serum: 42 mg/dL    Direct LDL: 181: LDL Cholesterol --- Interpretive Comment (for adults 18 and over)      < from: Transthoracic Echocardiogram (12.21.17 @ 10:24) >  EA reversal of the mitral inflow consistent with reduced compliance of   the   left ventricle.   Trace mitral regurgitation is present.   Fibrocalcific changes notedto the Aortic valve leaflets with preserved   leaflet excursion.   Normal appearing tricuspid valve structure and function.   Trace tricuspid valve regurgitation is present.   Normal appearing pulmonic valve structure and function.   Normal appearingleft atrium.   The left ventricle is normal in size, wall motion and contractility.   Mild concentric left ventricular hypertrophy is present.   Estimated left ventricular ejection fraction is 60-65 %.   Normal appearing right atrium.   Normal appearing right ventricle structure and function.   All visualized extra cardiac structures appears to be normal.   No evidence of pericardial effusion.   No evidence of pleural effusion.      < end of copied text >  < from: CT Head No Cont (12.19.17 @ 17:39) >  IMPRESSION:  Mildly motion degraded.    Suspect subacute infarct posterior left temporal occipital lobes as   above. No evidence of acute hemorrhage. Extensive chronic microvascular   changes.      < end of copied text >  < from: Xray Chest 1 View AP/PA. (12.19.17 @ 17:31) >  : Clear lungs.      < end of copied text >    < from: 12 Lead ECG (12.19.17 @ 16:39) >  Diagnosis Line Sinus rhythm with 1st degree A-V block  Right bundle branch block  Left anterior fascicular block  *** Bifascicular block ***  Minimal voltage criteria for LVH, may be normal variant  Septal infarct , age undetermined  Abnormal ECG  When compared with ECG of 30-AUG-2017 16:28,  No significant change was found    < end of copied text >

## 2017-12-25 LAB
CULTURE RESULTS: SIGNIFICANT CHANGE UP
SPECIMEN SOURCE: SIGNIFICANT CHANGE UP

## 2017-12-25 RX ORDER — HALOPERIDOL DECANOATE 100 MG/ML
1 INJECTION INTRAMUSCULAR ONCE
Qty: 0 | Refills: 0 | Status: COMPLETED | OUTPATIENT
Start: 2017-12-25 | End: 2017-12-25

## 2017-12-25 RX ORDER — HALOPERIDOL DECANOATE 100 MG/ML
0.5 INJECTION INTRAMUSCULAR EVERY 12 HOURS
Qty: 0 | Refills: 0 | Status: DISCONTINUED | OUTPATIENT
Start: 2017-12-25 | End: 2017-12-27

## 2017-12-25 RX ORDER — MEMANTINE HYDROCHLORIDE 10 MG/1
5 TABLET ORAL DAILY
Qty: 0 | Refills: 0 | Status: DISCONTINUED | OUTPATIENT
Start: 2017-12-25 | End: 2017-12-27

## 2017-12-25 RX ORDER — LANOLIN ALCOHOL/MO/W.PET/CERES
5 CREAM (GRAM) TOPICAL AT BEDTIME
Qty: 0 | Refills: 0 | Status: DISCONTINUED | OUTPATIENT
Start: 2017-12-25 | End: 2017-12-27

## 2017-12-25 RX ORDER — ACETAMINOPHEN 500 MG
650 TABLET ORAL AT BEDTIME
Qty: 0 | Refills: 0 | Status: DISCONTINUED | OUTPATIENT
Start: 2017-12-25 | End: 2017-12-27

## 2017-12-25 RX ADMIN — HEPARIN SODIUM 5000 UNIT(S): 5000 INJECTION INTRAVENOUS; SUBCUTANEOUS at 18:54

## 2017-12-25 RX ADMIN — Medication 325 MILLIGRAM(S): at 11:36

## 2017-12-25 RX ADMIN — AMLODIPINE BESYLATE 2.5 MILLIGRAM(S): 2.5 TABLET ORAL at 06:34

## 2017-12-25 RX ADMIN — HEPARIN SODIUM 5000 UNIT(S): 5000 INJECTION INTRAVENOUS; SUBCUTANEOUS at 06:34

## 2017-12-25 RX ADMIN — ATORVASTATIN CALCIUM 40 MILLIGRAM(S): 80 TABLET, FILM COATED ORAL at 21:19

## 2017-12-25 RX ADMIN — HALOPERIDOL DECANOATE 1 MILLIGRAM(S): 100 INJECTION INTRAMUSCULAR at 01:04

## 2017-12-25 RX ADMIN — QUETIAPINE FUMARATE 12.5 MILLIGRAM(S): 200 TABLET, FILM COATED ORAL at 06:34

## 2017-12-25 RX ADMIN — Medication 5 MILLIGRAM(S): at 22:54

## 2017-12-25 RX ADMIN — Medication 650 MILLIGRAM(S): at 21:19

## 2017-12-25 NOTE — PROGRESS NOTE ADULT - ASSESSMENT
89 y/o F with PMHx of dementia and HTN presents to the ED on 12/19/17  from [facility] assisted living (dementia unit) found to have subacute CVA in the posterior left temporal occipital lobes as seen on CT head and UTI.  GOC for treatement of UTI and comfort based care.    *Acute metabolic encephalopathy on top of baseline severe dementia- suspect 2/2 UTI and Subacute CVA, ischemic  - Per daughter pts baseline mental status is advanced dementia, [place], minimally verbal , variable po intake, calm and cooperative.  Pt intermittently with episodes of aaggitation/restlessness.  - Was initially given haldol x 1 dose and seroquel 25mg Hs on 12/20 with good effect.  Continue seroquel 12.5 HS and add 25mg once daily PRN.   - 1:1 dc'd  - Per discussion with pts daughter, plan is for conservative mgmt of CVA- no MRI/A planned.  Would not want pt to be anticoagulated.    - Echo EF 60-65%  - treated for UTI  - ASA.  Statin  - check TSH, B12 - wnl   - Speech and swallow appreciated dysphagia 1 w honey thick liquids  - tele monitoring - had episode 2:1 block, now in sinus no events  - PT  - Palliative care appreciated - DNR/DNI, comfort measures  - add  low dose namenda - can reduce confusion  - use melatonin and tylenol for insomnia    * Prolonged Qt on EKG - antipsychotic induced   - lower dose of seroquel and haldol  - repeat EKG in am    * HLD  - statins  - monitoring    *UTI  - Completed 3 days rocephin- dc'd.  - bcx no growth  - urine cultures not sent from ED. Will not send now as abx already given.    *HTN  - BP meds held initially d/t CVa. resumed on low dose norvasc.    *DVT ppx  - Heparin SubQ     Dispo - can be transfered to me-surg, d/c planning to [facility] with transition to hospice care

## 2017-12-25 NOTE — PROGRESS NOTE ADULT - SUBJECTIVE AND OBJECTIVE BOX
CHIEF COMPLAINT: AMS    SUBJECTIVE:  12/22 Pt weak. Calm this AM, verbalizing, following commands.  became somewhat aggitated/restless in the afternoon.  Pall care involement appreciated.  12/23 pt weak, lethargic  s/p Seroquel last night, denies pain, confused , tele - in sinus  12/24 - more alert, awake, answering simple questions, poor historian, denies pain, able to move all extremities  12/25 - agitation overnight, s/p haldol 1 mg , denies pain, confused, alert, awake, poor po intake    REVIEW OF SYSTEMS: All other review of systems is negative unless indicated above    T(C): 36.6 (12-25-17 @ 11:34), Max: 36.6 (12-25-17 @ 11:34)  T(F): 97.8 (12-25-17 @ 11:34), Max: 97.8 (12-25-17 @ 11:34)  HR: 99 (12-25-17 @ 11:34) (99 - 100)  BP: 119/89 (12-25-17 @ 11:34) (119/89 - 141/68)  RR: 17 (12-25-17 @ 11:34) (17 - 18)  SpO2: 96% (12-25-17 @ 05:14) (96% - 98%)  Wt(kg): --    PHYSICAL EXAM:  Constitutional: frail elderly female, calm, in NAD  HEENT: EOMI, NCAT  Neck: Soft and supple  Respiratory: Lungs grossly clear to auscultation  Cardiovascular: S1S2, RRR  Gastrointestinal: soft, nondistended, no guarding or rigidity  Extremities: No peripheral edema  Vascular: 2+ peripheral pulses  Neurological: follows commands, limited exam  Musculoskeletal: moving all extremities  Skin: No rashes    MEDICATIONS  (STANDING):  acetaminophen   Tablet. 650 milliGRAM(s) Oral at bedtime  amLODIPine   Tablet 2.5 milliGRAM(s) Oral daily  aspirin enteric coated 325 milliGRAM(s) Oral daily  atorvastatin 40 milliGRAM(s) Oral at bedtime  heparin  Injectable 5000 Unit(s) SubCutaneous every 12 hours  melatonin 5 milliGRAM(s) Oral at bedtime    MEDICATIONS  (PRN):  haloperidol    Injectable 0.5 milliGRAM(s) IntraMuscular every 12 hours PRN severe Agitation if seroquel not effective or unable to give PO  QUEtiapine 12.5 milliGRAM(s) Oral every 8 hours PRN aggitation    LABS: All Labs Reviewed:  12-23    142  |  108  |  18  ----------------------------<  86  4.0   |  25  |  0.71    Ca    9.1      23 Dec 2017 17:21                        13.7   7.2   )-----------( 157      ( 23 Dec 2017 17:21 )             43.3   CARDIAC MARKERS ( 23 Dec 2017 17:21 )  <0.015 ng/mL / x     / x     / x     / x          Comprehensive Metabolic Panel (12.19.17 @ 16:21)    Sodium, Serum: 143 mmol/L    Potassium, Serum: 4.2 mmol/L    Chloride, Serum: 107 mmol/L    Carbon Dioxide, Serum: 29 mmol/L    Anion Gap, Serum: 7 mmol/L    Blood Urea Nitrogen, Serum: 21 mg/dL    Creatinine, Serum: 0.83 mg/dL    Glucose, Serum: 99 mg/dL    Calcium, Total Serum: 9.5 mg/dL    Protein Total, Serum: 8.1 gm/dL    Albumin, Serum: 3.4 g/dL    Bilirubin Total, Serum: 0.5 mg/dL    Alkaline Phosphatase, Serum: 77 U/L    Aspartate Aminotransferase (AST/SGOT): 20 U/L    Alanine Aminotransferase (ALT/SGPT): 21 U/L     Complete Blood Count + Automated Diff (12.19.17 @ 16:21)    WBC Count: 7.7 K/uL    RBC Count: 5.24 M/uL    Hemoglobin: 15.1 g/dL    Hematocrit: 47.2 %    Mean Cell Volume: 90.2 fl    Mean Cell Hemoglobin: 28.8 pg    Mean Cell Hemoglobin Conc: 31.9 gm/dL    Red Cell Distrib Width: 13.2 %    Platelet Count - Automated: 256 K/uL    Auto Neutrophil #: 5.1 K/uL    Auto Lymphocyte #: 1.3 K/uL    Auto Monocyte #: 0.9 K/uL    Auto Eosinophil #: 0.2 K/uL    Auto Basophil #: 0.1 K/uL    Auto Neutrophil %: 66.7: Differential percentages must be correlated with absolute numbers for  clinical significance. %    Auto Lymphocyte %: 16.4 %    Auto Monocyte %: 12.3 %    Auto Eosinophil %: 3.0 %    Auto Basophil %: 1.6 %    Lipid Profile (12.20.17 @ 06:26)    Total Cholesterol/HDL Ratio Measurement: 5.9 RATIO    Cholesterol, Serum: 247 mg/dL    Triglycerides, Serum: 121 mg/dL    HDL Cholesterol, Serum: 42 mg/dL    Direct LDL: 181: LDL Cholesterol --- Interpretive Comment (for adults 18 and over)      < from: Transthoracic Echocardiogram (12.21.17 @ 10:24) >  EA reversal of the mitral inflow consistent with reduced compliance of   the   left ventricle.   Trace mitral regurgitation is present.   Fibrocalcific changes notedto the Aortic valve leaflets with preserved   leaflet excursion.   Normal appearing tricuspid valve structure and function.   Trace tricuspid valve regurgitation is present.   Normal appearing pulmonic valve structure and function.   Normal appearingleft atrium.   The left ventricle is normal in size, wall motion and contractility.   Mild concentric left ventricular hypertrophy is present.   Estimated left ventricular ejection fraction is 60-65 %.   Normal appearing right atrium.   Normal appearing right ventricle structure and function.   All visualized extra cardiac structures appears to be normal.   No evidence of pericardial effusion.   No evidence of pleural effusion.      < end of copied text >  < from: CT Head No Cont (12.19.17 @ 17:39) >  IMPRESSION:  Mildly motion degraded.    Suspect subacute infarct posterior left temporal occipital lobes as   above. No evidence of acute hemorrhage. Extensive chronic microvascular   changes.      < end of copied text >  < from: Xray Chest 1 View AP/PA. (12.19.17 @ 17:31) >  : Clear lungs.      < end of copied text >    < from: 12 Lead ECG (12.19.17 @ 16:39) >  Diagnosis Line Sinus rhythm with 1st degree A-V block  Right bundle branch block  Left anterior fascicular block  *** Bifascicular block ***  Minimal voltage criteria for LVH, may be normal variant  Septal infarct , age undetermined  Abnormal ECG  When compared with ECG of 30-AUG-2017 16:28,  No significant change was found    < end of copied text >

## 2017-12-26 LAB
ANION GAP SERPL CALC-SCNC: 7 MMOL/L — SIGNIFICANT CHANGE UP (ref 5–17)
BUN SERPL-MCNC: 20 MG/DL — SIGNIFICANT CHANGE UP (ref 7–23)
CALCIUM SERPL-MCNC: 9.4 MG/DL — SIGNIFICANT CHANGE UP (ref 8.5–10.1)
CHLORIDE SERPL-SCNC: 109 MMOL/L — HIGH (ref 96–108)
CO2 SERPL-SCNC: 29 MMOL/L — SIGNIFICANT CHANGE UP (ref 22–31)
CREAT SERPL-MCNC: 0.67 MG/DL — SIGNIFICANT CHANGE UP (ref 0.5–1.3)
GLUCOSE SERPL-MCNC: 88 MG/DL — SIGNIFICANT CHANGE UP (ref 70–99)
HCT VFR BLD CALC: 45.8 % — HIGH (ref 34.5–45)
HGB BLD-MCNC: 14.5 G/DL — SIGNIFICANT CHANGE UP (ref 11.5–15.5)
MCHC RBC-ENTMCNC: 28.5 PG — SIGNIFICANT CHANGE UP (ref 27–34)
MCHC RBC-ENTMCNC: 31.6 GM/DL — LOW (ref 32–36)
MCV RBC AUTO: 90.2 FL — SIGNIFICANT CHANGE UP (ref 80–100)
PLATELET # BLD AUTO: 253 K/UL — SIGNIFICANT CHANGE UP (ref 150–400)
POTASSIUM SERPL-MCNC: 4.2 MMOL/L — SIGNIFICANT CHANGE UP (ref 3.5–5.3)
POTASSIUM SERPL-SCNC: 4.2 MMOL/L — SIGNIFICANT CHANGE UP (ref 3.5–5.3)
RBC # BLD: 5.08 M/UL — SIGNIFICANT CHANGE UP (ref 3.8–5.2)
RBC # FLD: 13.4 % — SIGNIFICANT CHANGE UP (ref 10.3–14.5)
SODIUM SERPL-SCNC: 145 MMOL/L — SIGNIFICANT CHANGE UP (ref 135–145)
WBC # BLD: 7.6 K/UL — SIGNIFICANT CHANGE UP (ref 3.8–10.5)
WBC # FLD AUTO: 7.6 K/UL — SIGNIFICANT CHANGE UP (ref 3.8–10.5)

## 2017-12-26 PROCEDURE — 93010 ELECTROCARDIOGRAM REPORT: CPT

## 2017-12-26 RX ADMIN — HEPARIN SODIUM 5000 UNIT(S): 5000 INJECTION INTRAVENOUS; SUBCUTANEOUS at 17:55

## 2017-12-26 RX ADMIN — AMLODIPINE BESYLATE 2.5 MILLIGRAM(S): 2.5 TABLET ORAL at 06:11

## 2017-12-26 RX ADMIN — MEMANTINE HYDROCHLORIDE 5 MILLIGRAM(S): 10 TABLET ORAL at 12:44

## 2017-12-26 RX ADMIN — HEPARIN SODIUM 5000 UNIT(S): 5000 INJECTION INTRAVENOUS; SUBCUTANEOUS at 06:10

## 2017-12-26 RX ADMIN — Medication 325 MILLIGRAM(S): at 12:44

## 2017-12-26 NOTE — PROGRESS NOTE ADULT - ASSESSMENT
89 y/o F with PMHx of dementia and HTN presents to the ED on 12/19/17  from [facility] assisted living (dementia unit) found to have subacute CVA in the posterior left temporal occipital lobes as seen on CT head and UTI.  GOC for treatement of UTI and comfort based care.    *Acute metabolic encephalopathy on top of baseline severe dementia- suspect 2/2 UTI and Subacute CVA, ischemic  - Per daughter pts baseline mental status is advanced dementia, [place], minimally verbal , variable po intake, calm and cooperative.  Pt intermittently with episodes of aaggitation/restlessness.  - Was initially given haldol x 1 dose and seroquel 25mg Hs on 12/20 with good effect.  Continue seroquel 12.5 HS and add 25mg once daily PRN.   - 1:1 dc'd  - Per discussion with pts daughter, plan is for conservative mgmt of CVA- no MRI/A planned.  Would not want pt to be anticoagulated.    - Echo EF 60-65%  - treated for UTI  - ASA.  Statin  - check TSH, B12 - wnl   - Speech and swallow appreciated dysphagia 1 w honey thick liquids  - tele monitoring - had episode 2:1 block, now in sinus no events  - PT  - Palliative care appreciated - DNR/DNI, comfort measures  - add  low dose namenda - can reduce confusion  - use melatonin and tylenol for insomnia    * Prolonged Qt on EKG - antipsychotic induced   - lower dose of seroquel and haldol  - repeat EKG in am    * HLD  - statins  - monitoring    *UTI  - Completed 3 days rocephin- dc'd.  - bcx no growth  - urine cultures not sent from ED. Will not send now as abx already given.    *HTN  - BP meds held initially d/t CVa. resumed on low dose norvasc.    *DVT ppx  - Heparin SubQ     Dispo - can be transfered to me-surg, d/c planning to [facility] with transition to hospice care , awaiting [facility] palcement, need insurance prior authorization

## 2017-12-26 NOTE — PROGRESS NOTE ADULT - SUBJECTIVE AND OBJECTIVE BOX
CHIEF COMPLAINT: AMS    SUBJECTIVE:  12/22 Pt weak. Calm this AM, verbalizing, following commands.  became somewhat aggitated/restless in the afternoon.  Pall care involement appreciated.  12/23 pt weak, lethargic  s/p Seroquel last night, denies pain, confused , tele - in sinus  12/24 - more alert, awake, answering simple questions, poor historian, denies pain, able to move all extremities  12/25 - agitation overnight, s/p haldol 1 mg , denies pain, confused, alert, awake, poor po intake  12/26 - less agitated denies pain, poor historian, OOB to the chair, ambulating, tolerates PO intake    REVIEW OF SYSTEMS: All other review of systems is negative unless indicated above    T(C): 36.6 (12-26-17 @ 10:41), Max: 36.8 (12-25-17 @ 21:38)  T(F): 97.8 (12-26-17 @ 10:41), Max: 98.2 (12-25-17 @ 21:38)  HR: 83 (12-26-17 @ 10:41) (60 - 83)  BP: 125/98 (12-26-17 @ 10:41) (125/98 - 146/52)  RR: 18 (12-26-17 @ 10:41) (17 - 18)  SpO2: 97% (12-26-17 @ 10:41) (97% - 97%)  Wt(kg): --    PHYSICAL EXAM:  Constitutional: frail elderly female, calm, in NAD  HEENT: EOMI, NCAT  Neck: Soft and supple  Respiratory: Lungs grossly clear to auscultation  Cardiovascular: S1S2, RRR  Gastrointestinal: soft, nondistended, no guarding or rigidity  Extremities: No peripheral edema  Vascular: 2+ peripheral pulses  Neurological: follows commands, limited exam  Musculoskeletal: moving all extremities  Skin: No rashes    MEDICATIONS  (STANDING):  acetaminophen   Tablet. 650 milliGRAM(s) Oral at bedtime  amLODIPine   Tablet 2.5 milliGRAM(s) Oral daily  aspirin enteric coated 325 milliGRAM(s) Oral daily  atorvastatin 40 milliGRAM(s) Oral at bedtime  heparin  Injectable 5000 Unit(s) SubCutaneous every 12 hours  melatonin 5 milliGRAM(s) Oral at bedtime    MEDICATIONS  (PRN):  haloperidol    Injectable 0.5 milliGRAM(s) IntraMuscular every 12 hours PRN severe Agitation if seroquel not effective or unable to give PO  QUEtiapine 12.5 milliGRAM(s) Oral every 8 hours PRN aggitation    LABS: All Labs Reviewed:  12-26    145  |  109<H>  |  20  ----------------------------<  88  4.2   |  29  |  0.67    Ca    9.4      26 Dec 2017 07:44                         14.5   7.6   )-----------( 253      ( 26 Dec 2017 07:44 )             45.8       12-23    142  |  108  |  18  ----------------------------<  86  4.0   |  25  |  0.71    Ca    9.1      23 Dec 2017 17:21                        13.7   7.2   )-----------( 157      ( 23 Dec 2017 17:21 )             43.3   CARDIAC MARKERS ( 23 Dec 2017 17:21 )  <0.015 ng/mL / x     / x     / x     / x          Comprehensive Metabolic Panel (12.19.17 @ 16:21)    Sodium, Serum: 143 mmol/L    Potassium, Serum: 4.2 mmol/L    Chloride, Serum: 107 mmol/L    Carbon Dioxide, Serum: 29 mmol/L    Anion Gap, Serum: 7 mmol/L    Blood Urea Nitrogen, Serum: 21 mg/dL    Creatinine, Serum: 0.83 mg/dL    Glucose, Serum: 99 mg/dL    Calcium, Total Serum: 9.5 mg/dL    Protein Total, Serum: 8.1 gm/dL    Albumin, Serum: 3.4 g/dL    Bilirubin Total, Serum: 0.5 mg/dL    Alkaline Phosphatase, Serum: 77 U/L    Aspartate Aminotransferase (AST/SGOT): 20 U/L    Alanine Aminotransferase (ALT/SGPT): 21 U/L     Complete Blood Count + Automated Diff (12.19.17 @ 16:21)    WBC Count: 7.7 K/uL    RBC Count: 5.24 M/uL    Hemoglobin: 15.1 g/dL    Hematocrit: 47.2 %    Mean Cell Volume: 90.2 fl    Mean Cell Hemoglobin: 28.8 pg    Mean Cell Hemoglobin Conc: 31.9 gm/dL    Red Cell Distrib Width: 13.2 %    Platelet Count - Automated: 256 K/uL    Auto Neutrophil #: 5.1 K/uL    Auto Lymphocyte #: 1.3 K/uL    Auto Monocyte #: 0.9 K/uL    Auto Eosinophil #: 0.2 K/uL    Auto Basophil #: 0.1 K/uL    Auto Neutrophil %: 66.7: Differential percentages must be correlated with absolute numbers for  clinical significance. %    Auto Lymphocyte %: 16.4 %    Auto Monocyte %: 12.3 %    Auto Eosinophil %: 3.0 %    Auto Basophil %: 1.6 %    Lipid Profile (12.20.17 @ 06:26)    Total Cholesterol/HDL Ratio Measurement: 5.9 RATIO    Cholesterol, Serum: 247 mg/dL    Triglycerides, Serum: 121 mg/dL    HDL Cholesterol, Serum: 42 mg/dL    Direct LDL: 181: LDL Cholesterol --- Interpretive Comment (for adults 18 and over)      < from: Transthoracic Echocardiogram (12.21.17 @ 10:24) >  EA reversal of the mitral inflow consistent with reduced compliance of   the   left ventricle.   Trace mitral regurgitation is present.   Fibrocalcific changes notedto the Aortic valve leaflets with preserved   leaflet excursion.   Normal appearing tricuspid valve structure and function.   Trace tricuspid valve regurgitation is present.   Normal appearing pulmonic valve structure and function.   Normal appearingleft atrium.   The left ventricle is normal in size, wall motion and contractility.   Mild concentric left ventricular hypertrophy is present.   Estimated left ventricular ejection fraction is 60-65 %.   Normal appearing right atrium.   Normal appearing right ventricle structure and function.   All visualized extra cardiac structures appears to be normal.   No evidence of pericardial effusion.   No evidence of pleural effusion.      < end of copied text >  < from: CT Head No Cont (12.19.17 @ 17:39) >  IMPRESSION:  Mildly motion degraded.    Suspect subacute infarct posterior left temporal occipital lobes as   above. No evidence of acute hemorrhage. Extensive chronic microvascular   changes.      < end of copied text >  < from: Xray Chest 1 View AP/PA. (12.19.17 @ 17:31) >  : Clear lungs.      < end of copied text >    < from: 12 Lead ECG (12.19.17 @ 16:39) >  Diagnosis Line Sinus rhythm with 1st degree A-V block  Right bundle branch block  Left anterior fascicular block  *** Bifascicular block ***  Minimal voltage criteria for LVH, may be normal variant  Septal infarct , age undetermined  Abnormal ECG  When compared with ECG of 30-AUG-2017 16:28,  No significant change was found    < end of copied text >

## 2017-12-27 ENCOUNTER — TRANSCRIPTION ENCOUNTER (OUTPATIENT)
Age: 82
End: 2017-12-27

## 2017-12-27 VITALS
OXYGEN SATURATION: 100 % | RESPIRATION RATE: 16 BRPM | SYSTOLIC BLOOD PRESSURE: 133 MMHG | DIASTOLIC BLOOD PRESSURE: 63 MMHG | TEMPERATURE: 98 F | HEART RATE: 96 BPM

## 2017-12-27 PROCEDURE — 70450 CT HEAD/BRAIN W/O DYE: CPT | Mod: 26

## 2017-12-27 RX ORDER — METOPROLOL TARTRATE 50 MG
1 TABLET ORAL
Qty: 0 | Refills: 0 | COMMUNITY

## 2017-12-27 RX ORDER — ATORVASTATIN CALCIUM 80 MG/1
1 TABLET, FILM COATED ORAL
Qty: 0 | Refills: 0 | COMMUNITY
Start: 2017-12-27

## 2017-12-27 RX ORDER — ASPIRIN/CALCIUM CARB/MAGNESIUM 324 MG
1 TABLET ORAL
Qty: 0 | Refills: 0 | COMMUNITY
Start: 2017-12-27

## 2017-12-27 RX ORDER — QUETIAPINE FUMARATE 200 MG/1
12.5 TABLET, FILM COATED ORAL
Qty: 0 | Refills: 0 | COMMUNITY
Start: 2017-12-27

## 2017-12-27 RX ORDER — LANOLIN ALCOHOL/MO/W.PET/CERES
1 CREAM (GRAM) TOPICAL
Qty: 0 | Refills: 0 | COMMUNITY
Start: 2017-12-27

## 2017-12-27 RX ORDER — ACETAMINOPHEN 500 MG
2 TABLET ORAL
Qty: 0 | Refills: 0 | COMMUNITY
Start: 2017-12-27

## 2017-12-27 RX ORDER — MEMANTINE HYDROCHLORIDE 10 MG/1
1 TABLET ORAL
Qty: 0 | Refills: 0 | COMMUNITY
Start: 2017-12-27

## 2017-12-27 RX ADMIN — HEPARIN SODIUM 5000 UNIT(S): 5000 INJECTION INTRAVENOUS; SUBCUTANEOUS at 06:32

## 2017-12-27 NOTE — DISCHARGE NOTE ADULT - CARE PLAN
Principal Discharge DX:	CVA (cerebral vascular accident)  Goal:	prevent recurrence  Instructions for follow-up, activity and diet:	daily PT, continue asa, statins, follow up with neurology within 1-2 weeks  Secondary Diagnosis:	UTI (urinary tract infection)  Instructions for follow-up, activity and diet:	completed antibiotics  Secondary Diagnosis:	Dementia  Instructions for follow-up, activity and diet:	continue current medications - namenda, supportive care  Secondary Diagnosis:	HTN (hypertension)  Instructions for follow-up, activity and diet:	continue current medications

## 2017-12-27 NOTE — DISCHARGE NOTE ADULT - CARE PROVIDER_API CALL
Ezra Ziegler), Family Medicine  [address]  [address]  [place] [address]  Phone: [phone]  Fax: [phone]    Ellie Rolon), Neurology  General  [address] [place] [address]  [address]  [place] [address]  Phone: [phone]  Fax: [phone]

## 2017-12-27 NOTE — DIETITIAN INITIAL EVALUATION ADULT. - OTHER INFO
Nutrition assessment for length of stay. Nutrition assessment for length of stay. Dx: CVA, and UTI with pmhx of Dementia and HTN. Pt confused and unable to provide weight/po history. As per aide/RN pt consuming ~25% of meals at this time. Skin intact with no edema documented. NFPE indicates moderate muscle wasting: temporal, clavicle, scapula, and patella. Moderate fat loss: Tricep, and ocular. Pt meets criteria for severe protein/calorie malnutrition in context of acute illness secondary to moderate muscle/fat wasting, and poor intake <25% x 8 days. Recommendations: 1) Ensure enlive 8oz po TID. 2) Assistance with meals for optimal intake. 3) weekly weights

## 2017-12-27 NOTE — DISCHARGE NOTE ADULT - PLAN OF CARE
prevent recurrence daily PT, continue asa, statins, follow up with neurology within 1-2 weeks completed antibiotics continue current medications - namenda, supportive care continue current medications

## 2017-12-27 NOTE — DIETITIAN INITIAL EVALUATION ADULT. - ENERGY NEEDS
Ht.     66 "        Wt. 113.3   #              BMI   18.3               IBW  130  #               Pt is at   87 %  IBW

## 2017-12-27 NOTE — PROGRESS NOTE ADULT - SUBJECTIVE AND OBJECTIVE BOX
Pt was seen and examined at bedside because Rn called to notify pt fell. Night RN reported that pt was found next to her bed, on her knees. Pt is confused, couldn't answer questions. Unwitnessed fall. Bruise on elbow that is observed by RN prior to fall too. Pt denies any acute pain in UE or LE. On exam, resting conformably in bed in front of nursing station now, No knee bruise, b/l UE and LE ROM intact, no spinal tenderness .    Vitals: 97.5 F, 109/75, 63, 18, 100% at RA  A/P:  # Inpatient fall  - Fall precaution  - CT head - pt is on Aspirin and Heparin SC for DVT Ppx - will hold on AC and Anti ply until CT  head results comeback.  - Monitor vitals   - Neuro check    Hospitalist ot f/u in AM Pt was seen and examined at bedside because Rn called to notify pt fell. Night RN reported that pt was found next to her bed, on her knees. Pt is confused, couldn't answer questions. Unwitnessed fall. Bruise on elbow that is observed by RN prior to fall too. Pt denies any acute pain in UE or LE. On exam, resting conformably in bed in front of nursing station now, No knee bruise, b/l UE and LE ROM intact, no spinal tenderness .    Vitals: 97.5 F, 109/75, 63, 18, 100% at RA  A/P:  # Inpatient fall  - Fall precaution  - CT head - pt is on Aspirin and Heparin SC for DVT Ppx - will hold on AC and Anti plt until CT  head results comeback.  - Monitor vitals   - Neuro check    Hospitalist ot f/u in AM    Spoke with Pt's Daughter about inpatient fall around 6 AM on 12/27/2017.  Daughter will come in AM.

## 2017-12-27 NOTE — DISCHARGE NOTE ADULT - HOSPITAL COURSE
89 y/o F with PMHx of dementia and HTN presents to the ED on 12/19/17  from [facility] assisted [facility] (dementia unit) found to have subacute CVA in the posterior left temporal occipital lobes as seen on CT head and UTI.  GOC for treatement of UTI and comfort based care.  12/22 Pt weak. Calm this AM, verbalizing, following commands.  became somewhat aggitated/restless in the afternoon.  Pall care involement appreciated.  12/23 pt weak, lethargic  s/p Seroquel last night, denies pain, confused , tele - in sinus  12/24 - more alert, awake, answering simple questions, poor historian, denies pain, able to move all extremities  12/25 - agitation overnight, s/p haldol 1 mg , denies pain, confused, alert, awake, poor po intake  12/26 - less agitated denies pain, poor historian, OOB to the chair, ambulating, tolerates PO intake  12/27 - fall in am to the knees, unwitnessed, denies HA, abd pain, knee pain, poor historian, confused, repeat CT head - no change  REVIEW OF SYSTEMS: All other review of systems is negative unless indicated above  T(C): 36.4 (12-27-17 @ 04:06), Max: 37.1 (12-26-17 @ 20:55)  T(F): 97.5 (12-27-17 @ 04:06), Max: 98.7 (12-26-17 @ 20:55)  HR: 63 (12-27-17 @ 04:06) (63 - 95)  BP: 109/75 (12-27-17 @ 04:06) (109/75 - 127/63)  RR: 18 (12-27-17 @ 04:06) (17 - 18)  SpO2: 97% (12-27-17 @ 04:06) (97% - 98%)  Wt(kg): --  PHYSICAL EXAM:  Constitutional: frail elderly female, calm, in NAD  HEENT: EOMI, NCAT  Neck: Soft and supple  Respiratory: Lungs grossly clear to auscultation  Cardiovascular: S1S2, RRR  Gastrointestinal: soft, nondistended, no guarding or rigidity  Extremities: No peripheral edema  Vascular: 2+ peripheral pulses  Neurological: follows commands, limited exam  Musculoskeletal: moving all extremities  Skin: No rashes  *Acute metabolic encephalopathy on top of baseline severe dementia- suspect 2/2 UTI and Subacute CVA - left occipital lobe, ischemic  - Per daughter pts baseline mental status is advanced dementia, [place], minimally verbal , variable po intake, calm and cooperative.  Pt intermittently with episodes of aggitation/restlessness.  - Was initially given haldol x 1 dose and seroquel 25mg Hs on 12/20 with good effect.     - Continue seroquel 12.5 q8h prn , monitor EKG for QT prolongation   - 1:1 dc'd  - Per discussion with pts daughter, plan is for conservative mgmt of CVA- no MRI/A planned.  Would not want pt to be anticoagulated.    - Echo EF 60-65%  - treated for UTI  - ASA.  Statin  - check TSH, B12 - wnl   - Speech and swallow appreciated dysphagia 1 w honey thick liquids  - tele monitoring - had episode 2:1 block, now in sinus no events  - PT  - Palliative care appreciated - DNR/DNI, comfort measures  - add  low dose namenda - can reduce confusion  - use melatonin and tylenol prn for insomnia    * Prolonged Qt on EKG - antipsychotic induced   - lower dose of seroquel and haldol  - monitor EKG     * HLD  - statins  - monitoring    *UTI  - Completed 3 days rocephin- dc'd.  - bcx no growth  - urine cultures not sent from ED. Will not send now as abx already given.    *HTN  - BP meds held initially d/t CVa. resumed on low dose norvasc.    Disposition - medically optimized to be discharged  to [facility]  with close follow up with PCP within 1 week  completed antibiotics  return to ED if fever, abdominal pain, nausea, vomiting, chest pain, dyspnea  Discharge plan discussed with patient, RN  Patient advised to follow up with PCP within 3-7 days  time spend 40 min

## 2017-12-27 NOTE — DISCHARGE NOTE ADULT - PATIENT PORTAL LINK FT
“You can access the [facility] Patient Portal, offered by [facility], by registering with the following website: [email or web address]”

## 2017-12-27 NOTE — DISCHARGE NOTE ADULT - OTHER SIGNIFICANT FINDINGS
Complete Blood Count in AM (12.26.17 @ 07:44)    WBC Count: 7.6 K/uL    RBC Count: 5.08 M/uL    Hemoglobin: 14.5 g/dL    Hematocrit: 45.8 %    Mean Cell Volume: 90.2 fl    Mean Cell Hemoglobin: 28.5 pg    Mean Cell Hemoglobin Conc: 31.6 gm/dL    Red Cell Distrib Width: 13.4 %    Platelet Count - Automated: 253 K/uL    Basic Metabolic Panel in AM (12.26.17 @ 07:44)    Sodium, Serum: 145 mmol/L    Potassium, Serum: 4.2 mmol/L    Chloride, Serum: 109 mmol/L    Carbon Dioxide, Serum: 29 mmol/L    Anion Gap, Serum: 7 mmol/L    Blood Urea Nitrogen, Serum: 20 mg/dL    Creatinine, Serum: 0.67 mg/dL    Glucose, Serum: 88 mg/dL    Calcium, Total Serum: 9.4 mg/dL      Lipid Profile (12.20.17 @ 06:26)    Total Cholesterol/HDL Ratio Measurement: 5.9 RATIO    Cholesterol, Serum: 247 mg/dL    Triglycerides, Serum: 121 mg/dL    HDL Cholesterol, Serum: 42 mg/dL    Direct LDL: 181:  Thyroid Stimulating Hormone, Serum (12.23.17 @ 17:21)    Thyroid Stimulating Hormone, Serum: 1.050 uU/mL    Vitamin B12, Serum (12.23.17 @ 17:21)    Vitamin B12, Serum: 729: Note: Reference Range Change on 12/18/2017. pg/mL    Hemoglobin A1C, Whole Blood (12.20.17 @ 06:26)    Hemoglobin A1C, Whole Blood: 5.8:   < from: CT Head No Cont (12.27.17 @ 05:36) >  Slight increased hypodensity and demarcation of the transcortical   hypodensity within the left occipital lobe, likely representing expected   infarct evolution. There is no acute intracranial hemorrhage, midline   shift, extra-axial collection, hydrocephalus, or evidence of acute   vascular territorial infarction. Moderate to severe patchy hypodensities   within the periventricular and subcortical white matter, although   nonspecific, likely reflect chronic microvascular disease. Calcified   intracranial atherosclerosis is present. Cerebral volume loss results in   prominence of the ventricles and sulci.    Partial opacification of the right inferior mastoid air cells, unchanged   since prior examination. Left mastoid air cells and visualized paranasal   sinuses are clear. Status post bilateral cataract surgery.    IMPRESSION:     No acute intracranial hemorrhage or calvarial fracture.    < end of copied text >  < from: Xray Chest 1 View AP/PA. (12.19.17 @ 17:31) >  Cardiac silhouette is rotated. Calcified mediastinal and hilar lymph   nodes are noted. Clear lungs. No effusion.    IMPRESSION: Clear lungs.      < end of copied text >  < from: 12 Lead ECG (12.24.17 @ 08:43) >  Normal sinus rhythm  Right bundle branch block  Left anterior fascicular block  *** Bifascicular block ***  Minimal voltage criteria for LVH, may be normal variant  T wave abnormality, consider lateral ischemia  Abnormal ECG  When compared with ECG of 19-DEC-2017 16:39,  Criteria for Septal infarct are no longer Present  T wave inversion now evident in Lateral leads    < end of copied text >

## 2017-12-27 NOTE — CHART NOTE - NSCHARTNOTEFT_GEN_A_CORE
Upon Nutritional Assessment by the Registered Dietitian your patient was determined to meet criteria / has evidence of the following diagnosis/diagnoses:          [ ]  Mild Protein Calorie Malnutrition        [ ]  Moderate Protein Calorie Malnutrition        [ x] Severe Protein Calorie Malnutrition        [ ] Unspecified Protein Calorie Malnutrition        [x ] Underweight / BMI <19        [ ] Morbid Obesity / BMI > 40      Findings as based on:  •  Comprehensive nutrition assessment and consultation  •  Calorie counts (nutrient intake analysis)  •  Food acceptance and intake status from observations by staff  •  Follow up  •  Patient education  •  Intervention secondary to interdisciplinary rounds  •   concerns      Treatment:    1) Ensure enlive 8oz po TID.   2) Assistance with meals for optimal intake.   3) weekly weights     The following diet has been recommended:  Continue with pureed and honey thick fluids     PROVIDER Section:     By signing this assessment you are acknowledging and agree with the diagnosis/diagnoses assigned by the Registered Dietitian    Comments:

## 2017-12-27 NOTE — DIETITIAN INITIAL EVALUATION ADULT. - NS AS NUTRI DX NUTRIENT
Pt meets criteria for severe protein/calorie malnutrition in context of acute illness secondary to moderate muscle/fat wasting, and poor intake <25% x 8 days./Malnutrition

## 2018-01-03 ENCOUNTER — EMERGENCY (EMERGENCY)
Facility: HOSPITAL | Age: 83
LOS: 0 days | Discharge: HOME | End: 2018-01-03
Attending: EMERGENCY MEDICINE | Admitting: EMERGENCY MEDICINE
Payer: COMMERCIAL

## 2018-01-03 VITALS
DIASTOLIC BLOOD PRESSURE: 78 MMHG | OXYGEN SATURATION: 99 % | HEART RATE: 84 BPM | RESPIRATION RATE: 18 BRPM | TEMPERATURE: 97 F | SYSTOLIC BLOOD PRESSURE: 147 MMHG

## 2018-01-03 VITALS
DIASTOLIC BLOOD PRESSURE: 76 MMHG | TEMPERATURE: 97 F | OXYGEN SATURATION: 98 % | SYSTOLIC BLOOD PRESSURE: 147 MMHG | HEART RATE: 81 BPM | RESPIRATION RATE: 16 BRPM

## 2018-01-03 DIAGNOSIS — R53.1 WEAKNESS: ICD-10-CM

## 2018-01-03 DIAGNOSIS — F03.90 UNSPECIFIED DEMENTIA WITHOUT BEHAVIORAL DISTURBANCE: ICD-10-CM

## 2018-01-03 DIAGNOSIS — N39.0 URINARY TRACT INFECTION, SITE NOT SPECIFIED: ICD-10-CM
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
## 2018-01-03 DIAGNOSIS — I45.81 LONG QT SYNDROME: ICD-10-CM

## 2018-01-03 DIAGNOSIS — E78.5 HYPERLIPIDEMIA, UNSPECIFIED: ICD-10-CM

## 2018-01-03 DIAGNOSIS — I63.9 CEREBRAL INFARCTION, UNSPECIFIED: ICD-10-CM

## 2018-01-03 DIAGNOSIS — I10 ESSENTIAL (PRIMARY) HYPERTENSION: ICD-10-CM

## 2018-01-03 DIAGNOSIS — Z86.73 PERSONAL HISTORY OF TRANSIENT ISCHEMIC ATTACK (TIA), AND CEREBRAL INFARCTION WITHOUT RESIDUAL DEFICITS: ICD-10-CM

## 2018-01-03 DIAGNOSIS — S50.00XA CONTUSION OF UNSPECIFIED ELBOW, INITIAL ENCOUNTER: ICD-10-CM

## 2018-01-03 DIAGNOSIS — Z51.5 ENCOUNTER FOR PALLIATIVE CARE: ICD-10-CM
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
## 2018-01-03 DIAGNOSIS — Z79.2 LONG TERM (CURRENT) USE OF ANTIBIOTICS: ICD-10-CM

## 2018-01-03 DIAGNOSIS — I45.2 BIFASCICULAR BLOCK: ICD-10-CM

## 2018-01-03 DIAGNOSIS — G93.41 METABOLIC ENCEPHALOPATHY: ICD-10-CM
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
## 2018-01-03 DIAGNOSIS — E43 UNSPECIFIED SEVERE PROTEIN-CALORIE MALNUTRITION: ICD-10-CM

## 2018-01-03 LAB
ALBUMIN SERPL ELPH-MCNC: 3.4 G/DL — SIGNIFICANT CHANGE UP (ref 3.3–5)
ALP SERPL-CCNC: 72 U/L — SIGNIFICANT CHANGE UP (ref 40–120)
ALT FLD-CCNC: 20 U/L — SIGNIFICANT CHANGE UP (ref 12–78)
ANION GAP SERPL CALC-SCNC: 7 MMOL/L — SIGNIFICANT CHANGE UP (ref 5–17)
APPEARANCE UR: CLEAR — SIGNIFICANT CHANGE UP
AST SERPL-CCNC: 15 U/L — SIGNIFICANT CHANGE UP (ref 15–37)
BACTERIA # UR AUTO: (no result)
BASOPHILS # BLD AUTO: 0.1 K/UL — SIGNIFICANT CHANGE UP (ref 0–0.2)
BASOPHILS NFR BLD AUTO: 1.1 % — SIGNIFICANT CHANGE UP (ref 0–2)
BILIRUB SERPL-MCNC: 0.3 MG/DL — SIGNIFICANT CHANGE UP (ref 0.2–1.2)
BILIRUB UR-MCNC: NEGATIVE — SIGNIFICANT CHANGE UP
BUN SERPL-MCNC: 24 MG/DL — HIGH (ref 7–23)
CALCIUM SERPL-MCNC: 9.8 MG/DL — SIGNIFICANT CHANGE UP (ref 8.5–10.1)
CHLORIDE SERPL-SCNC: 113 MMOL/L — HIGH (ref 96–108)
CO2 SERPL-SCNC: 31 MMOL/L — SIGNIFICANT CHANGE UP (ref 22–31)
COLOR SPEC: YELLOW — SIGNIFICANT CHANGE UP
COMMENT - URINE: SIGNIFICANT CHANGE UP
CREAT SERPL-MCNC: 0.93 MG/DL — SIGNIFICANT CHANGE UP (ref 0.5–1.3)
DIFF PNL FLD: (no result)
EOSINOPHIL # BLD AUTO: 0.2 K/UL — SIGNIFICANT CHANGE UP (ref 0–0.5)
EOSINOPHIL NFR BLD AUTO: 1.6 % — SIGNIFICANT CHANGE UP (ref 0–6)
EPI CELLS # UR: SIGNIFICANT CHANGE UP
GLUCOSE SERPL-MCNC: 108 MG/DL — HIGH (ref 70–99)
GLUCOSE UR QL: NEGATIVE MG/DL — SIGNIFICANT CHANGE UP
HCT VFR BLD CALC: 50.3 % — HIGH (ref 34.5–45)
HGB BLD-MCNC: 15.7 G/DL — HIGH (ref 11.5–15.5)
KETONES UR-MCNC: NEGATIVE — SIGNIFICANT CHANGE UP
LEUKOCYTE ESTERASE UR-ACNC: (no result)
LYMPHOCYTES # BLD AUTO: 1.4 K/UL — SIGNIFICANT CHANGE UP (ref 1–3.3)
LYMPHOCYTES # BLD AUTO: 13.5 % — SIGNIFICANT CHANGE UP (ref 13–44)
MCHC RBC-ENTMCNC: 28.6 PG — SIGNIFICANT CHANGE UP (ref 27–34)
MCHC RBC-ENTMCNC: 31.2 GM/DL — LOW (ref 32–36)
MCV RBC AUTO: 91.8 FL — SIGNIFICANT CHANGE UP (ref 80–100)
MONOCYTES # BLD AUTO: 0.9 K/UL — SIGNIFICANT CHANGE UP (ref 0–0.9)
MONOCYTES NFR BLD AUTO: 8.5 % — SIGNIFICANT CHANGE UP (ref 2–14)
NEUTROPHILS # BLD AUTO: 7.8 K/UL — HIGH (ref 1.8–7.4)
NEUTROPHILS NFR BLD AUTO: 75.4 % — SIGNIFICANT CHANGE UP (ref 43–77)
NITRITE UR-MCNC: NEGATIVE — SIGNIFICANT CHANGE UP
PH UR: 5 — SIGNIFICANT CHANGE UP (ref 5–8)
PLATELET # BLD AUTO: 250 K/UL — SIGNIFICANT CHANGE UP (ref 150–400)
POTASSIUM SERPL-MCNC: 4.1 MMOL/L — SIGNIFICANT CHANGE UP (ref 3.5–5.3)
POTASSIUM SERPL-SCNC: 4.1 MMOL/L — SIGNIFICANT CHANGE UP (ref 3.5–5.3)
PROT SERPL-MCNC: 8.1 GM/DL — SIGNIFICANT CHANGE UP (ref 6–8.3)
PROT UR-MCNC: 15 MG/DL
RBC # BLD: 5.47 M/UL — HIGH (ref 3.8–5.2)
RBC # FLD: 13.6 % — SIGNIFICANT CHANGE UP (ref 10.3–14.5)
RBC CASTS # UR COMP ASSIST: SIGNIFICANT CHANGE UP /HPF (ref 0–4)
SODIUM SERPL-SCNC: 151 MMOL/L — HIGH (ref 135–145)
SP GR SPEC: 1.02 — SIGNIFICANT CHANGE UP (ref 1.01–1.02)
UROBILINOGEN FLD QL: NEGATIVE MG/DL — SIGNIFICANT CHANGE UP
WBC # BLD: 10.4 K/UL — SIGNIFICANT CHANGE UP (ref 3.8–10.5)
WBC # FLD AUTO: 10.4 K/UL — SIGNIFICANT CHANGE UP (ref 3.8–10.5)
WBC UR QL: SIGNIFICANT CHANGE UP

## 2018-01-03 PROCEDURE — 71045 X-RAY EXAM CHEST 1 VIEW: CPT | Mod: 26

## 2018-01-03 PROCEDURE — 99284 EMERGENCY DEPT VISIT MOD MDM: CPT

## 2018-01-03 RX ORDER — SODIUM CHLORIDE 9 MG/ML
500 INJECTION INTRAMUSCULAR; INTRAVENOUS; SUBCUTANEOUS ONCE
Qty: 0 | Refills: 0 | Status: COMPLETED | OUTPATIENT
Start: 2018-01-03 | End: 2018-01-03

## 2018-01-03 RX ADMIN — SODIUM CHLORIDE 500 MILLILITER(S): 9 INJECTION INTRAMUSCULAR; INTRAVENOUS; SUBCUTANEOUS at 17:24

## 2018-01-03 NOTE — ED ADULT NURSE NOTE - OBJECTIVE STATEMENT
Pt presented to ED for failure to thrive. As per EMS, pt's not being eating and participate in rehab activities lately. Pt poor historian due to dementia. No complains at this time.

## 2018-01-03 NOTE — ED PROVIDER NOTE - CONSTITUTIONAL, MLM
normal... Well appearing, well nourished, awake, alert, oriented to person, place, time/situation and in no apparent distress. Well appearing, well nourished, awake, alert,  and in no apparent distress.

## 2018-01-03 NOTE — ED ADULT NURSE NOTE - CHPI ED SYMPTOMS NEG
no fever/no nausea/no pain/no dizziness/no decreased eating/drinking/no numbness/no chills/no vomiting/no tingling/no weakness

## 2018-01-03 NOTE — ED PROVIDER NOTE - OBJECTIVE STATEMENT
89 y/o f with PMHx of CVA, HTN, dementia, coming in from [facility] for failure to thrive. Dr Dobson (PCP at [facility]) states pt is not appropriate for rehab at this time, Pt is chronically il, unable to eat or participate in rehab activities, and he sent pt here for placement into nursing home 89 y/o f with PMHx of CVA, HTN, dementia, coming in from [facility] for failure to thrive. Dr Dobson (PCP at [facility]) states pt is not appropriate for rehab at this time, Pt is chronically il, unable to eat or participate in rehab activities, and he sent pt here for placement into nursing home. Pt unable to provide more history.

## 2018-01-03 NOTE — ED PROVIDER NOTE - PROGRESS NOTE DETAILS
AJM: NO medical reason for admission. Spoke with SOWMYA Shaffer who notes pt needs to be transported back to rehab for NH placement. Rehab agrees to accept pt back

## 2018-01-03 NOTE — ED PROVIDER NOTE - MEDICAL DECISION MAKING DETAILS
Pt presetting with failure to thrive form rehab center. Check labs, ua , cxray, iv with fluids, if no criteria for admission, will d/c back to rehab for placement. Social work to consult.

## 2018-01-04 PROBLEM — I10 ESSENTIAL (PRIMARY) HYPERTENSION: Chronic | Status: ACTIVE | Noted: 2017-12-19

## 2018-04-05 NOTE — CONSULT NOTE ADULT - SUBJECTIVE AND OBJECTIVE BOX
HPI: Ms. dEen is a 89y old Female coming from [facility] (dementia unit) with hx of advanced dementia (FAST7, few words, incontinent), 4 ER visits this year, and HTN, admitted  for altered mental status and decreased po intake. Found to have +UTI and CTH showing subacute stroke in posterior Left temporal occipital lobes. Family not desiring further workup only tx of infection. Palliative Care consulted to help establish GOC.     Met Ms. Eden this afternoon, 1:1 at bedside. Pt would not open eyes or interact when prompted. Appears comfortable. As per 1:1 and nursing, pt just cleaned and worked with PT some prior to encounter, and was calm. Pt noted to be agitated overnight requiring haldol and seroquel, but much calmer today, able to say few words, but confused. Pt unable to contribute further to HPI.     Jaspal Fuentes called pt's daughter, who we knew from record, thought she had surgery today and would unlikely be able to answer. Message left with team's contact information to arrange for further conversation. Daughter then showed up shortly after, clarifying that she needs surgery but this is not scheduled yet. Spoke to her via phone from floor and she confirmed DNR and DNI status and agreed to meeting tomorrow at 11am, as her intent is for comfort, noting this is her mother's wishes.      PAIN: ( )Yes   (x )No- no nonverbal signs of pain   DYSPNEA: ( ) Yes  (x ) No- non non-verbal signs of dyspnea  Level:    PAST MEDICAL & SURGICAL HISTORY:  HTN (hypertension)  Dementia  No significant past surgical history      SOCIAL HX: Lives at [facility], need more information from family    Hx opiate tolerance ( )YES  (x )NO    Baseline ADLs  (Prior to Admission)- As per [facility] documentation, pt incontinent. Need more information about her ADLs  ( ) Independent   ( )Dependent    FAMILY HISTORY:  No pertinent family history in first degree relatives      Review of Systems:  Unable to obtain/Limited due to: mental status      PHYSICAL EXAM:    Vital Signs Last 24 Hrs  T(C): 36.7 (21 Dec 2017 10:51), Max: 37 (20 Dec 2017 20:01)  T(F): 98.1 (21 Dec 2017 10:51), Max: 98.6 (20 Dec 2017 20:01)  HR: 85 (21 Dec 2017 10:51) (78 - 85)  BP: 151/69 (21 Dec 2017 10:51) (142/71 - 170/79)  BP(mean): 98 (21 Dec 2017 05:20) (98 - 98)  RR: 18 (21 Dec 2017 10:51) (17 - 18)  SpO2: 95% (21 Dec 2017 10:51) (95% - 100%)  Daily     Daily Weight in k.4 (20 Dec 2017 20:50)    PPSV2: 20-30  %  FAST: 7    General: Elderly female lying in bed, appears comfortable, does not open eyes   Mental Status: unable to gather  HEENT: mmm, perrl, (allowed opening of eyelids)  Lungs: clear  Cardiac: +s1 s2 rrr  GI: soft nt nd +bs  : incontinent  Ext: no edema  Neuro: unable to do full exam due to mental status, not following commands      LABS:                        15.1   7.7   )-----------( 256      ( 19 Dec 2017 16:21 )             47.2     12-    141  |  107  |  17  ----------------------------<  93  3.9   |  27  |  0.71    Ca    8.9      20 Dec 2017 06:26    TPro  8.1  /  Alb  3.4  /  TBili  0.5  /  DBili  x   /  AST  20  /  ALT  21  /  AlkPhos  77  12-    PT/INR - ( 19 Dec 2017 16:21 )   PT: 11.6 sec;   INR: 1.07 ratio         PTT - ( 19 Dec 2017 16:21 )  PTT:31.8 sec  Albumin: Albumin, Serum: 3.4 g/dL ( @ 16:21)      Allergies    No Known Allergies    Intolerances      MEDICATIONS  (STANDING):  aspirin enteric coated 325 milliGRAM(s) Oral daily  atorvastatin 40 milliGRAM(s) Oral at bedtime  cefTRIAXone Injectable 1000 milliGRAM(s) IV Push every 24 hours  heparin  Injectable 5000 Unit(s) SubCutaneous every 12 hours    MEDICATIONS  (PRN):  QUEtiapine 25 milliGRAM(s) Oral at bedtime PRN agitation      RADIOLOGY/ADDITIONAL STUDIES:    EXAM:  CT BRAIN                        PROCEDURE DATE:  2017    INTERPRETATION:  CLINICAL HISTORY:  Change in mental status.    TECHNIQUE:  CT of the head without contrast.  Contiguous transaxial images of the head were acquired from the skull   base to the vertex without the administration of iodinated contrast.   Coronal and sagittal reformatted images are provided.     COMPARISON:  CT head from 2017    FINDINGS:  There is motion artifact present which degrades image quality.    There is no acute intracranial hemorrhage. There is new low attenuation   in the left posterior temporal occipital lobes with associated mass   effect and sulcal effacement as best seen on series 2 images 19 through   12. Additional extensive areas of low-attenuation in the bilateral   periventricular white matter appears unchanged and likely due to chronic   microvascular change.    The ventricles, sulci and cisternal spaces are stable in size and   configuration demonstrating cerebral volume loss. There is no midline   shift or abnormal extra-axial fluid collection.    The calvarium is intact.  There are no osteoblastic or lytic calvarial or   skull base lesions.  The paranasal sinuses and left mastoid air cells are   clear. There michelle small right mastoid effusion.    IMPRESSION:  Mildly motion degraded.    Suspect subacute infarct posterior left temporal occipital lobes as   above. No evidence of acute hemorrhage. Extensive chronic microvascular   changes.      SARAH RODRÍGUEZ M.D.; ATTENDING RADIOLOGIST  This document has been electronically signed. Dec 19 2017  6:00PM        EXAM:  CHEST SINGLE VIEW FRONTAL                        PROCEDURE DATE:  2017    INTERPRETATION:  CHEST SINGLE VIEW FRONTAL    Single AP view    HISTORY:  crackles/change n ms    Comparison:  Chest x-ray 2017    Cardiac silhouette is rotated. Calcified mediastinal and hilar lymph   nodes are noted. Clear lungs. No effusion.    IMPRESSION: Clear lungs.        KELLI HERRMANN   This document has been electronically signed. Dec 20 2017 10:49AM
PT ACUTE  Treatment Session        Pt seen on SICU nursing unit.                                                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 Very pleasant and motivated. \"Surrounded by all these beautiful smiles.\"  (04/05/18 1305)  Subjective/Objective Comments: RN Ibis aware of session and pts mobility. Pt resting in recliner at end of session with all needs met and in reach.  (04/05/18 1305)    OBJECTIVE:  Basic Lines: IV;Drain;NG;Telemetry;Continuous pulse oximetry;Central line;Complex lines (04/05/18 1305)  Complex Lines: Arterial line (04/05/18 1305)  Safety Measures: Alarms (call light in reach) (04/05/18 1305)    RN reported Gaitan Fall Scale Score: 60    ASSESSMENT:   Pt resting in supine at beginning of session and in recliner at end of session. RN aware of session and pts mobility. Pt performed bed mobility with max assist of 1, HOB slightly elevated. Tactile and verbal cues for BLE advancement and log roll technique. Pt performed transfers with total assist-max assist of 2 to stand from elevated EOB in STEDY. Attempted to stand x1 with max assist of 2-pt unable to clear buttocks from bed. Use of taps sheet on second attempt for facilitation of hip extension and upright posture. Pt tolerated standing in stedy x 5-10 seconds before 
needing to sit. Limited tolerance due to fatigue and weakness. Pt very motivated with progress this session. Educated on completing seated BLE exercises in sitting and supine. Pt will benefit from continued therapy to address deficits and to increase functional mobility.      Other Therapeutic Intervention: Increased time needed for line management, coordinating with RN for session, time for rest breaks and education on progression (04/05/18 1305)     PT Identified Barriers to Discharge: Current functional mobility. Stairs, Medical Condition     PLAN:   Continue skilled PT, including the following Treatment/Interventions: Functional transfer training;Strengthening;Endurance training;Bed mobility;Equipment eval/education;Gait training;Safety Education;Neuromuscular re-education (04/05/18 1305)   Frequency Comments: RADHA EMERY to see please (04/05/18 1305)    Treatment Plan for Next Session: Next session should focus on improving bed mobility, transferring with Lashae Steady, Improving standing tolerance with steady, And working on sitting tolerance and sitting dynamic activities, LE strengthening  Additional Plan Considerations: Bring Rehab Aide       RECOMMENDATIONS FOR DISCHARGE:  Recommendation for Discharge: PT: Post acute therapy;Intensive therapy program (04/05/18 1305)    PT/OT Mobility Equipment for Discharge: Continue to monitor (04/05/18 1305)  PT/OT ADL Equipment for Discharge: monitor for adaptive equipment  (04/05/18 1305)    Last 24 hours of Functional Data  Bed Mobility   Bed Mobility  Supine to Sit: Maximal Assist (Max) (04/05/18 1305)  Bed Mobility Comments: Max assist of 1 for bed mobility, cues for technique. Tactile cues for BLE advancement to EOB and assist at trunk for support into sitting. Verbal cues to scoot hips to EOB.  (04/05/18 1305)    Transfers  Transfers  Sit to Stand: Total Assist - Dependent (04/05/18 1305)  Stand to Sit: Total Assist - Dependent (04/05/18 1305)  Stand Pivot 
Transfers: Total Assist - Dependent (04/05/18 1305)  Assistive Device/: 2 People;Gait Belt (STEDY) (04/05/18 1305)  Transfer Comments 1: Pt completed sit to stand from elevated EOB x 2. One first stand pt unable to clear buttocks from bed, displayed flexed posture with decreased anterior weight shift. One second stand-use of taps sheet for hip support and anterior weight shifting. Max of 2 needed to stand. Facilitation of anterior weight shifting, hip extension and trunk extension required. Pt tolerated standing x 10 seconds this session before needing to sit in recliner. Completed pivot to recliner in STEDY (04/05/18 1305)      Gait  Gait  Gait Assistance: Not attempted due to medical condition;Not attempted due to safety concerns (04/05/18 1305)  Gait Comments 1: attempts to increase standing tolerance in stedy this session (04/05/18 1305),      Balance  Balance  Sitting - Static: Supversion (Supv) (04/05/18 1305)  Sitting - Dynamic: Minimal Assist (Min) (04/05/18 1305)  Standing - Static: Total Assist (Total) (04/05/18 1305)  Standing - Dynamic (eyes open): Total Assist (Total) (04/05/18 1305)  Balance Comments #1: sitting balance EOB without lean or LOB. Assist of 2 for standing balance in stedy due to weakness and fatigue.  (04/05/18 1305)    Patient's Personal Goal: get stronger to walk (04/04/18 1309)    Therapy Goals:    Goals  Short Term Goals to Be Reviewed On: 04/09/18 (04/02/18 0920)  Short Term Goals = Discharge Goals: No (04/02/18 0920)  Goal Agreement: Patient agrees with goals and treatment plan (04/02/18 0920)  Bed Mobility Short Term Goal: Pt will perform bed mobility as a mod A of 1 (04/02/18 0920)  Bed Mobility Discharge Goal: Pt to be independent with all be mobility activities while maintaining abdominal precautions (04/02/18 0920)  Bed Mobility Discharge Goal Progress: Outcome not met, continue to monitor (03/19/18 1550)  Transfer Short Term Goal: Pt to be a mod A of 1 with LRAD 
(04/02/18 0920)  Transfer Discharge Goal: Pt to transfer as modified independent with least restrictive AD (04/02/18 0920)  Transfer Discharge Goal Progress: Outcome not met, continue to monitor (03/19/18 1550)  Ambulation Short Term Goal: Pt to ambulate 25 feet as a min A of 1 with least restrictive AD (04/02/18 0920)  Ambulation Discharge Goal: Pt to ambulate 150 ft as modified independent with least restrictive AD (04/02/18 0920)  Ambulation Discharge Goal Progress: Outcome not met, continue to monitor (03/19/18 1550)  Stairs Discharge Goal: Pt to perform 3 stairs modified independently (04/02/18 0920)  Stairs Discharge Goal Progress: Outcome not met, continue to monitor (03/19/18 1550)      PT Time Spent: 40 minutes (04/05/18 1305)    See PT flowsheet for full details regarding the PT therapy provided.        
Patient is a 89y old  Female who presents with a chief complaint of AMS  yesterday from [facility] .      HPI:  89 y/o F with PMHx of dementia and HTN presents to the ED from [facility] assisted living (dementia unit) with change in mental status. Per ED chart review, as per EMS, pt is at baseline, but pt has not been eating or drinking. Pt is a poor historian due to dementia.  Patient mumbles answers to questions and nods her head in response to questions however does not follow directions, which is patient's baseline . Information obtained from ER and admission note. CT reported abnormal and neurology consult requested.    While in the ED patient was noted to have CT head positive for subacute stroke and UTI.  Patient was given ASA 325mg x1 and ceftriaxone. (20 Dec 2017 01:49)      PAST MEDICAL & SURGICAL HISTORY:  HTN (hypertension)  Dementia  No significant past surgical history      FAMILY HISTORY:  No pertinent family history in first degree relatives      Social Hx:  Nonsmoker, no drug or alcohol use    MEDICATIONS  (STANDING):  aspirin enteric coated 325 milliGRAM(s) Oral daily  atorvastatin 40 milliGRAM(s) Oral at bedtime  cefTRIAXone Injectable 1000 milliGRAM(s) IV Push every 24 hours  heparin  Injectable 5000 Unit(s) SubCutaneous every 12 hours  sodium chloride 0.9%. 1000 milliLiter(s) (75 mL/Hr) IV Continuous <Continuous>       Allergies    No Known Allergies    ROS: Pertinent positives in HPI, all other ROS were reviewed and are negative.      Vital Signs Last 24 Hrs  T(C): 36.7 (20 Dec 2017 12:28), Max: 36.8 (19 Dec 2017 14:35)  T(F): 98.1 (20 Dec 2017 12:28), Max: 98.3 (19 Dec 2017 14:35)  HR: 83 (20 Dec 2017 12:28) (70 - 83)  BP: 159/87 (20 Dec 2017 12:28) (123/84 - 159/87)  BP(mean): --  RR: 18 (20 Dec 2017 12:28) (16 - 18)  SpO2: 96% (20 Dec 2017 12:28) (96% - 98%)        Constitutional: awake and alert agitated and confused.  HEENT: PERRLA, ? visual defects.  Neck: Supple.  Respiratory: Breath sounds are clear bilaterally  Cardiovascular: S1 and S2, regular  rhythm  Gastrointestinal: soft, nontender  Extremities:  no edema  Vascular:   carotid Bruit - no  Musculoskeletal: no abnormal movements  Skin: No rashes    Neurological exam:  HF: Awake, confused, restless and agitated. can not asess speech , doesn't follow commands.  CN: Eugenie, Vf defect? doesn't appear responds to eye contact.  Motor:  Strength can not be asessed , moves both UE > than Le on her own but not to commands.  Sens:  can not asess  Reflexes: +2 BUE.+1 to 0 BLE , downgoing toes b/l  Coord:   can not asess  Gait/Balance: Cannot test    NIHSS: can not asess due to Ms          Labs:   12-20    141  |  107  |  17  ----------------------------<  93  3.9   |  27  |  0.71    Ca    8.9      20 Dec 2017 06:26    TPro  8.1  /  Alb  3.4  /  TBili  0.5  /  DBili  x   /  AST  20  /  ALT  21  /  AlkPhos  77  12-19 12-20 Chol 247<H> <H> HDL 42 Trig 121                          15.1   7.7   )-----------( 256      ( 19 Dec 2017 16:21 )             47.2       Radiology:  - CT Head:12/19/17      IMPRESSION:  Mildly motion degraded.    Suspect subacute infarct posterior left temporal occipital lobes as   above. No evidence of acute hemorrhage. Extensive chronic microvascular   changes.

## 2019-09-13 NOTE — DISCHARGE NOTE ADULT - MEDICATION SUMMARY - MEDICATIONS TO TAKE
I will START or STAY ON the medications listed below when I get home from the hospital:    acetaminophen 325 mg oral tablet  -- 2 tab(s) by mouth once a day (at bedtime)  -- Indication: For Pain    aspirin 325 mg oral delayed release tablet  -- 1 tab(s) by mouth once a day  -- Indication: For CEREBRAL VASCULAR ACCIDENT,URINARY TRACT INFECTION    atorvastatin 40 mg oral tablet  -- 1 tab(s) by mouth once a day (at bedtime)  -- Indication: For CEREBRAL VASCULAR ACCIDENT,URINARY TRACT INFECTION    QUEtiapine  -- 12.5 milligram(s) by mouth every 8 hours, As Needed for aggitation  -- Indication: For Dementia    amLODIPine 2.5 mg oral tablet  -- 1 tab(s) by mouth once a day  -- Indication: For HTN (hypertension)    memantine 5 mg oral tablet  -- 1 tab(s) by mouth once a day  -- Indication: For Dementia    melatonin 5 mg oral tablet  -- 1 tab(s) by mouth once a day (at bedtime), As Needed for insomnia  -- Indication: For insomnia
exposed bone and open wounds--> excision to and including bone

## 2019-11-28 NOTE — ED PROVIDER NOTE - CARE PLAN
Color consistent with ethnicity/race, warm, dry intact, resilient. Color consistent with ethnicity/race, warm, dry intact, resilient. Principal Discharge DX:	Altered mental status  Secondary Diagnosis:	Dehydration

## 2021-03-24 NOTE — ED ADULT TRIAGE NOTE - NS ED NURSE DIRECT TO ROOM YN
Lab Results   Component Value Date    HGBA1C 6 8 (H) 09/02/2020       No results for input(s): POCGLU in the last 72 hours      Blood Sugar Average: Last 72 hrs:  ·  diet controlled outpatient  · Sliding-scale insulin coverage with Accu-Cheks while inpatient Yes

## 2022-03-24 NOTE — ED ADULT TRIAGE NOTE - TEMPERATURE IN CELSIUS (DEGREES C)
Concerned for possible stroke.  Reports 2 nights ago she saw an arch in her left eye with flashing lights.  Eye has felt \"off\" since.   Seen at [facility] yesterday and was dx with an ocular migraine.  States she still has a frontal headache and her left eye takes longer to focus when she turns her head.   Feels lightheaded. Denies blurred or double vision.   Taking Tylenol for her headache.   
36.6

## 2022-04-12 NOTE — ED PROVIDER NOTE - CARDIAC, MLM
Normal rate, regular rhythm.  Heart sounds S1, S2.  No murmurs, rubs or gallops. Attending Attestation (For Attendings USE Only)...

## 2022-10-26 NOTE — ED PROVIDER NOTE - CPE EDP SKIN NORM
What Type Of Note Output Would You Prefer (Optional)?: Standard Output
Hpi Title: Evaluation of Skin Lesions
How Severe Are Your Spot(S)?: mild
normal...

## 2023-01-16 NOTE — ED PROVIDER NOTE - CPE EDP EYES NORM
normal... General Sunscreen Counseling: I recommended a broad spectrum sunscreen with a SPF of 30 or higher.  I explained that SPF 30 sunscreens block approximately 97 percent of the sun's harmful rays.  Sunscreens should be applied at least 15 minutes prior to expected sun exposure and then every 2 hours after that as long as sun exposure continues. If swimming or exercising sunscreen should be reapplied every 45 minutes to an hour after getting wet or sweating.  One ounce, or the equivalent of a shot glass full of sunscreen, is adequate to protect the skin not covered by a bathing suit. I also recommended a lip balm with a sunscreen as well. Sun protective clothing can be used in lieu of sunscreen but must be worn the entire time you are exposed to the sun's rays. Detail Level: Detailed

## 2023-04-03 NOTE — ED ADULT TRIAGE NOTE - WEIGHT IN KG
65.8 Arava Counseling:  Patient counseled regarding adverse effects of Arava including but not limited to nausea, vomiting, abnormalities in liver function tests. Patients may develop mouth sores, rash, diarrhea, and abnormalities in blood counts. The patient understands that monitoring is required including LFTs and blood counts.  There is a rare possibility of scarring of the liver and lung problems that can occur when taking methotrexate. Persistent nausea, loss of appetite, pale stools, dark urine, cough, and shortness of breath should be reported immediately. Patient advised to discontinue Arava treatment and consult with a physician prior to attempting conception. The patient will have to undergo a treatment to eliminate Arava from the body prior to conception.

## 2023-09-11 NOTE — PHYSICAL THERAPY INITIAL EVALUATION ADULT - FOLLOWS COMMANDS/ANSWERS QUESTIONS, REHAB EVAL
50% of the time/able to follow single-step instructions Rinvoq Counseling: I discussed with the patient the risks of Rinvoq therapy including but not limited to upper respiratory tract infections, shingles, cold sores, bronchitis, nausea, cough, fever, acne, and headache. Live vaccines should be avoided.  This medication has been linked to serious infections; higher rate of mortality; malignancy and lymphoproliferative disorders; major adverse cardiovascular events; thrombosis; thrombocytopenia, anemia, and neutropenia; lipid elevations; liver enzyme elevations; and gastrointestinal perforations.

## 2023-12-05 NOTE — ED ADULT NURSE NOTE - CAS EDN DISCHARGE ASSESSMENT
Call CORE nurse for any questions or concerns Mon-Fri 8am-4pm:                                                 #[phone]                                       For concerns after hours:                                               #[phone]      Medication changes:     Start Jardiance 10 mg once daily.     Plan from today:     7 day Ziopatch monitor     CORE follow up with me in 1 month with labs prior    Lab results: see attached:   Component      Latest Ref Rng 10/19/2023  10:29 AM 11/13/2023  8:25 AM   Sodium      135 - 145 mmol/L 139  141    Potassium      3.4 - 5.3 mmol/L 4.1  3.9    Chloride      98 - 107 mmol/L 101  104    Carbon Dioxide (CO2)      22 - 29 mmol/L 26  26    Anion Gap      7 - 15 mmol/L 12  11    Urea Nitrogen      8.0 - 23.0 mg/dL 22.6  18.3    Creatinine      0.51 - 0.95 mg/dL 0.95  0.89    GFR Estimate      >60 mL/min/1.73m2 66  72    Calcium      8.8 - 10.2 mg/dL 10.2  9.7    Glucose      70 - 99 mg/dL 101 (H)  107 (H)    N-Terminal Pro Bnp      0 - 900 pg/mL  1,192 (H)          Patient baseline mental status

## 2023-12-30 NOTE — DIETITIAN INITIAL EVALUATION ADULT. - PROBLEM SELECTOR PLAN 1
-Admit to telemetry  -CT head - subacute infarction in the posterior left temporal occipital lobes, no acute hemorrhage  -s/p ASA 325mg in the ED  -continue  mg daily  -atorvastatin 40mg  -Lipid panel  -neuro consult - Dr. RONALD Rolon  -MRI/MRA head  -MRA neck   -neuro checks q4h  -fall precautions  -NPO  -Physical therapy consult  -speech and swallow eval.    -ECHO 4 = No assist / stand by assistance

## 2024-01-11 NOTE — ED ADULT NURSE NOTE - PT NEEDS ASSIST
[0] : 2) Feeling down, depressed, or hopeless: Not at all (0) [PHQ-2 Negative - No further assessment needed] : PHQ-2 Negative - No further assessment needed [[id number]] : 0 [Never] : Never yes [facility]

## 2024-07-19 NOTE — ED ADULT TRIAGE NOTE - BP NONINVASIVE SYSTOLIC (MM HG)
pt c/o cough and generalized weakness. stated " she had covid on 7/2, tested negative on 7/9, but still coughing a lot"
147

## 2025-07-15 NOTE — ED ADULT NURSE NOTE - GASTROINTESTINAL WDL
-On NC  -Tolerated HD yesterday  -Will plan next HD tomorrow  -S/p bilateral thoracentesis yesterday; 1300ml from the right and 500ml from the left    Thank you    MDM:  -Pleuritic pain likely from large right pleural effusion  -ESRD on HD MWF  -Anemia of ESRD  -HTN  -CAD and s/p coronary stents at his previous admission  -CKD-MBD: hyperphosphatemia      Abdomen soft, nontender, nondistended, bowel sounds present in all 4 quadrants.